# Patient Record
Sex: FEMALE | Race: WHITE | NOT HISPANIC OR LATINO | Employment: UNEMPLOYED | URBAN - METROPOLITAN AREA
[De-identification: names, ages, dates, MRNs, and addresses within clinical notes are randomized per-mention and may not be internally consistent; named-entity substitution may affect disease eponyms.]

---

## 2018-03-26 ENCOUNTER — OFFICE VISIT (OUTPATIENT)
Dept: FAMILY MEDICINE CLINIC | Facility: CLINIC | Age: 48
End: 2018-03-26
Payer: COMMERCIAL

## 2018-03-26 VITALS
DIASTOLIC BLOOD PRESSURE: 72 MMHG | BODY MASS INDEX: 22.96 KG/M2 | WEIGHT: 138 LBS | RESPIRATION RATE: 16 BRPM | HEART RATE: 76 BPM | TEMPERATURE: 100.6 F | SYSTOLIC BLOOD PRESSURE: 102 MMHG

## 2018-03-26 DIAGNOSIS — Z13.1 SCREENING FOR DIABETES MELLITUS (DM): ICD-10-CM

## 2018-03-26 DIAGNOSIS — F32.A ANXIETY AND DEPRESSION: ICD-10-CM

## 2018-03-26 DIAGNOSIS — F41.9 ANXIETY AND DEPRESSION: ICD-10-CM

## 2018-03-26 DIAGNOSIS — Z13.83 SCREENING FOR CARDIOVASCULAR, RESPIRATORY, AND GENITOURINARY DISEASES: ICD-10-CM

## 2018-03-26 DIAGNOSIS — J20.9 ACUTE BRONCHITIS, UNSPECIFIED ORGANISM: Primary | ICD-10-CM

## 2018-03-26 DIAGNOSIS — Z13.6 SCREENING FOR CARDIOVASCULAR, RESPIRATORY, AND GENITOURINARY DISEASES: ICD-10-CM

## 2018-03-26 DIAGNOSIS — Z13.89 SCREENING FOR CARDIOVASCULAR, RESPIRATORY, AND GENITOURINARY DISEASES: ICD-10-CM

## 2018-03-26 PROCEDURE — 99203 OFFICE O/P NEW LOW 30 MIN: CPT | Performed by: FAMILY MEDICINE

## 2018-03-26 RX ORDER — DULOXETIN HYDROCHLORIDE 20 MG/1
20 CAPSULE, DELAYED RELEASE ORAL DAILY
Qty: 90 CAPSULE | Refills: 1 | Status: SHIPPED | OUTPATIENT
Start: 2018-03-26 | End: 2018-05-07 | Stop reason: SDUPTHER

## 2018-03-26 RX ORDER — AZITHROMYCIN 250 MG/1
TABLET, FILM COATED ORAL
Qty: 6 TABLET | Refills: 0 | Status: SHIPPED | OUTPATIENT
Start: 2018-03-26 | End: 2018-03-31

## 2018-03-26 RX ORDER — CLONAZEPAM 2 MG/1
TABLET ORAL
COMMUNITY
End: 2018-03-26

## 2018-03-26 NOTE — PROGRESS NOTES
Assessment/Plan:    No problem-specific Assessment & Plan notes found for this encounter  Bronchitis new  Cont mucinex   Mood stable, cont duloxetine which I refilled w/o BZD  Screening labs ordered for upcoming cpe  F/u gyn, get yearly mammo  Colonoscopy this year suggested     Diagnoses and all orders for this visit:    Acute bronchitis, unspecified organism  -     azithromycin (ZITHROMAX) 250 mg tablet; Take 500mg on day 1, 250mg on days 2-5    Screening for diabetes mellitus (DM)  -     Comprehensive metabolic panel; Future    Screening for cardiovascular, respiratory, and genitourinary diseases  -     Lipid Panel with Direct LDL reflex; Future  -     TSH, 3rd generation; Future    Anxiety and depression  -     DULoxetine (CYMBALTA) 20 mg capsule; Take 1 capsule (20 mg total) by mouth daily    BMI 22 0-22 9, adult    Other orders  -     Discontinue: clonazePAM (KLONOPIN) 2 mg tablet; Take by mouth              Return for Annual physical     Subjective:      Patient ID: Bubba Barnett is a 50 y o  female  Chief Complaint   Patient presents with    Cough     Pt c/o chills, fever, cough and head congestion for the past two days   Nasal Congestion    Fever       HPI  Hollice Surekha sx  Last seen >4y ago  Last labs April/may 2017  Premenopausal  Sick contacts  5d  Chills  Fever  99 or more  Sore throat/irritated  Cough  Worse  More uncomfortable  Sharp px  Some mucinex taken  advil taken  No ear pain  Sinus congestion  No n/v/c/d  Babysitting    Sees psych from [facility], duloxetine, feels stable, not on klonopin over 1y and ok w/o  Aware that I do not recommend BZD use now or in future, agreeable  The following portions of the patient's history were reviewed and updated as appropriate: allergies, current medications, past family history, past medical history, past social history, past surgical history and problem list     Review of Systems   Constitutional: Negative for appetite change and fatigue     HENT: Positive for sinus pain and sinus pressure  Negative for nosebleeds and trouble swallowing  Eyes: Negative for pain, discharge and itching  Respiratory: Negative for shortness of breath and wheezing  Cardiovascular: Negative for chest pain  Gastrointestinal: Negative for abdominal pain, nausea and vomiting  Genitourinary: Negative for difficulty urinating and dysuria  Musculoskeletal: Negative for arthralgias  Skin: Negative for pallor and rash  Neurological: Negative for dizziness and syncope  Hematological: Negative for adenopathy  Psychiatric/Behavioral: Negative for dysphoric mood  The patient is not nervous/anxious  Current Outpatient Prescriptions   Medication Sig Dispense Refill    DULoxetine (CYMBALTA) 20 mg capsule Take 1 capsule (20 mg total) by mouth daily 90 capsule 1    azithromycin (ZITHROMAX) 250 mg tablet Take 500mg on day 1, 250mg on days 2-5 6 tablet 0     No current facility-administered medications for this visit  Objective:    /72   Pulse 76   Temp (!) 100 6 °F (38 1 °C)   Resp 16   Wt 62 6 kg (138 lb)   LMP 03/25/2018 (Exact Date)   BMI 22 96 kg/m²        Physical Exam   Constitutional: She appears well-developed  HENT:   Head: Normocephalic  Mouth/Throat: No oropharyngeal exudate  Eyes: Conjunctivae are normal  Right eye exhibits no discharge  Left eye exhibits no discharge  Neck: Neck supple  No thyromegaly present  Cardiovascular: Normal rate, regular rhythm and intact distal pulses  No murmur heard  Pulmonary/Chest: Effort normal and breath sounds normal  No respiratory distress  She exhibits no tenderness  Coarse midline cough   Abdominal: Soft  She exhibits no distension  There is no rebound  Musculoskeletal: She exhibits no edema or deformity  Lymphadenopathy:     She has no cervical adenopathy  Neurological: She is alert  Skin: Skin is warm and dry     Psychiatric: Her behavior is normal  Thought content normal  Nursing note and vitals reviewed               Thad Santana, DO

## 2018-03-27 ENCOUNTER — OFFICE VISIT (OUTPATIENT)
Dept: FAMILY MEDICINE CLINIC | Facility: CLINIC | Age: 48
End: 2018-03-27
Payer: COMMERCIAL

## 2018-03-27 VITALS
HEART RATE: 20 BPM | HEIGHT: 65 IN | TEMPERATURE: 101.2 F | DIASTOLIC BLOOD PRESSURE: 70 MMHG | RESPIRATION RATE: 16 BRPM | SYSTOLIC BLOOD PRESSURE: 126 MMHG

## 2018-03-27 DIAGNOSIS — F41.9 ANXIETY AND DEPRESSION: ICD-10-CM

## 2018-03-27 DIAGNOSIS — J20.8 ACUTE BRONCHITIS DUE TO OTHER SPECIFIED ORGANISMS: ICD-10-CM

## 2018-03-27 DIAGNOSIS — J11.1 INFLUENZA-LIKE ILLNESS: Primary | ICD-10-CM

## 2018-03-27 DIAGNOSIS — F32.A ANXIETY AND DEPRESSION: ICD-10-CM

## 2018-03-27 LAB — S PYO AG THROAT QL: NEGATIVE

## 2018-03-27 PROCEDURE — 99214 OFFICE O/P EST MOD 30 MIN: CPT | Performed by: FAMILY MEDICINE

## 2018-03-27 PROCEDURE — 87880 STREP A ASSAY W/OPTIC: CPT | Performed by: FAMILY MEDICINE

## 2018-03-27 RX ORDER — OSELTAMIVIR PHOSPHATE 75 MG/1
75 CAPSULE ORAL 2 TIMES DAILY
Qty: 10 CAPSULE | Refills: 0 | Status: SHIPPED | OUTPATIENT
Start: 2018-03-27 | End: 2018-04-02

## 2018-03-27 RX ORDER — PROMETHAZINE HYDROCHLORIDE AND CODEINE PHOSPHATE 6.25; 1 MG/5ML; MG/5ML
5 SYRUP ORAL EVERY 4 HOURS PRN
Qty: 120 ML | Refills: 0 | Status: SHIPPED | OUTPATIENT
Start: 2018-03-27 | End: 2018-05-07

## 2018-03-27 NOTE — PROGRESS NOTES
Assessment/Plan:    No problem-specific Assessment & Plan notes found for this encounter  Pt seen upon arrival at her request w/o appt     Diagnoses and all orders for this visit:    Influenza-like illness  -     oseltamivir (TAMIFLU) 75 mg capsule; Take 1 capsule (75 mg total) by mouth 2 (two) times a day for 5 days  -     promethazine-codeine (PHENERGAN WITH CODEINE) 6 25-10 mg/5 mL syrup; Take 5 mL by mouth every 4 (four) hours as needed for cough  -     XR chest pa & lateral; Future    Acute bronchitis due to other specified organisms  -     oseltamivir (TAMIFLU) 75 mg capsule; Take 1 capsule (75 mg total) by mouth 2 (two) times a day for 5 days  -     promethazine-codeine (PHENERGAN WITH CODEINE) 6 25-10 mg/5 mL syrup; Take 5 mL by mouth every 4 (four) hours as needed for cough  -     XR chest pa & lateral; Future  -     POCT rapid strepA    Anxiety and depression        Cont otc  Chloraseptic suggested  F/u if no better        No Follow-up on file  Subjective:      Patient ID: Shabnam Burch is a 50 y o  female  Chief Complaint   Patient presents with   Eston Chum Like Symptoms     not feeling better from visit yesterday        HPI    Worse today, high fever 102 this am, headache, chills, body aches, severe sore throat, cough is painful, no wheeze, tylenol and advil and mucinex  Took zpack yesterday  No other sick exposures  No flu shot this season     Anxiety/depression stable    The following portions of the patient's history were reviewed and updated as appropriate: allergies, current medications, past family history, past medical history, past social history, past surgical history and problem list     Review of Systems   Constitutional: Positive for chills and fever  Neurological: Negative for dizziness           Current Outpatient Prescriptions   Medication Sig Dispense Refill    azithromycin (ZITHROMAX) 250 mg tablet Take 500mg on day 1, 250mg on days 2-5 6 tablet 0    DULoxetine (CYMBALTA) 20 mg capsule Take 1 capsule (20 mg total) by mouth daily 90 capsule 1    oseltamivir (TAMIFLU) 75 mg capsule Take 1 capsule (75 mg total) by mouth 2 (two) times a day for 5 days 10 capsule 0    promethazine-codeine (PHENERGAN WITH CODEINE) 6 25-10 mg/5 mL syrup Take 5 mL by mouth every 4 (four) hours as needed for cough 120 mL 0     No current facility-administered medications for this visit  Objective:    /70   Pulse (!) 20   Temp (!) 101 2 °F (38 4 °C)   Resp 16   Ht 5' 5" (1 651 m)   LMP 03/25/2018 (Exact Date)        Physical Exam   Constitutional: She appears well-developed  HENT:   Head: Normocephalic  Mouth/Throat: Posterior oropharyngeal erythema present  No oropharyngeal exudate, posterior oropharyngeal edema or tonsillar abscesses  Eyes: Conjunctivae are normal    Neck: Neck supple  No tracheal deviation present  No thyromegaly present  Cardiovascular: Normal rate and intact distal pulses  Pulmonary/Chest: Effort normal  No stridor  No respiratory distress  Abdominal: Soft  Musculoskeletal: She exhibits no edema or deformity  Lymphadenopathy:     She has no cervical adenopathy  Neurological: She is alert  Skin: Skin is warm and dry  Psychiatric: Her behavior is normal  Thought content normal    Nursing note and vitals reviewed               Eamon Armstrong DO

## 2018-03-28 NOTE — PATIENT INSTRUCTIONS
Stay on the zithromax and cold medication and fever medication  Start the flu medication called Tamiflu today  Do a chest xray by Friday if you do not feel better  Use the codeine cough syrup for pain and cough at night time but carefully

## 2018-04-02 ENCOUNTER — OFFICE VISIT (OUTPATIENT)
Dept: FAMILY MEDICINE CLINIC | Facility: CLINIC | Age: 48
End: 2018-04-02
Payer: COMMERCIAL

## 2018-04-02 VITALS
RESPIRATION RATE: 20 BRPM | WEIGHT: 136 LBS | SYSTOLIC BLOOD PRESSURE: 122 MMHG | HEART RATE: 78 BPM | BODY MASS INDEX: 22.66 KG/M2 | DIASTOLIC BLOOD PRESSURE: 70 MMHG | TEMPERATURE: 98.8 F | HEIGHT: 65 IN

## 2018-04-02 DIAGNOSIS — J20.8 ACUTE BRONCHITIS DUE TO OTHER SPECIFIED ORGANISMS: Primary | ICD-10-CM

## 2018-04-02 DIAGNOSIS — J11.1 INFLUENZA-LIKE ILLNESS: ICD-10-CM

## 2018-04-02 PROCEDURE — 99213 OFFICE O/P EST LOW 20 MIN: CPT | Performed by: FAMILY MEDICINE

## 2018-04-02 RX ORDER — METHYLPREDNISOLONE 4 MG/1
TABLET ORAL
Qty: 21 TABLET | Refills: 0 | Status: SHIPPED | OUTPATIENT
Start: 2018-04-02 | End: 2018-04-08

## 2018-04-02 NOTE — PROGRESS NOTES
Assessment/Plan:    No problem-specific Assessment & Plan notes found for this encounter  Weakness, f/u if no better, labs possible  LEONCIO improving       Diagnoses and all orders for this visit:    Acute bronchitis due to other specified organisms  -     Methylprednisolone 4 MG TBPK; Use as directed on package    Influenza-like illness        And mucinex  Call 48hrs if no better, another abx? 10d  Lungs clear  No homans        No Follow-up on file  Subjective:      Patient ID: Abraham Chowdhury is a 50 y o  female  Chief Complaint   Patient presents with    Follow-up     Bronchitis  rmklpn       HPI   Fever coming down, 99 yest, sweats all day and night, cough is bad, frequent, greenish thick mucus, not wheeze, voice improving, feel very weak, did not do cxr, not as painful throat, sleeping ok, some cough wakes her,  not sick yet, eating /drinking ok, some dec appetite, taking mucinex   No hemoptysis  Did not need the prometh/codeine  Finished the zpack and tamiflu  Severe sore throat is improved    The following portions of the patient's history were reviewed and updated as appropriate: allergies, current medications, past family history, past medical history, past social history, past surgical history and problem list     Review of Systems   Respiratory: Negative for shortness of breath and wheezing  Neurological: Negative for dizziness  Current Outpatient Prescriptions   Medication Sig Dispense Refill    DULoxetine (CYMBALTA) 20 mg capsule Take 1 capsule (20 mg total) by mouth daily 90 capsule 1    promethazine-codeine (PHENERGAN WITH CODEINE) 6 25-10 mg/5 mL syrup Take 5 mL by mouth every 4 (four) hours as needed for cough 120 mL 0    Methylprednisolone 4 MG TBPK Use as directed on package 21 tablet 0     No current facility-administered medications for this visit          Objective:    /70   Pulse 78   Temp 98 8 °F (37 1 °C)   Resp 20   Ht 5' 5" (1 651 m)   Wt 61 7 kg (136 lb)   LMP 03/25/2018 (Exact Date)   BMI 22 63 kg/m²        Physical Exam   Constitutional: She appears well-developed  HENT:   Head: Normocephalic  Eyes: Conjunctivae are normal    Neck: Neck supple  Cardiovascular: Normal rate and intact distal pulses  Pulmonary/Chest: Effort normal  No respiratory distress  She has no wheezes  She has no rales  Coarse cough   Abdominal: Soft  Musculoskeletal: She exhibits no edema or deformity  Neurological: She is alert  Skin: Skin is warm and dry  Psychiatric: Her behavior is normal  Thought content normal    Nursing note and vitals reviewed               Avinash Nixon DO

## 2018-04-02 NOTE — PATIENT INSTRUCTIONS
You can call office in 48hrs if no better as I would likely call in a 10 day course an antibiotic and ask you to get the Chest Xray done

## 2018-04-02 NOTE — PROGRESS NOTES
Assessment/Plan:    No problem-specific Assessment & Plan notes found for this encounter  Diagnoses and all orders for this visit:    Acute bronchitis due to other specified organisms  -     Methylprednisolone 4 MG TBPK; Use as directed on package              No Follow-up on file  Subjective:      Patient ID: Gabriela Montana is a 50 y o  female  Chief Complaint   Patient presents with    Follow-up     Bronchitis  rmklpn       HPI    The following portions of the patient's history were reviewed and updated as appropriate: allergies, current medications, past family history, past medical history, past social history, past surgical history and problem list     Review of Systems      Current Outpatient Prescriptions   Medication Sig Dispense Refill    DULoxetine (CYMBALTA) 20 mg capsule Take 1 capsule (20 mg total) by mouth daily 90 capsule 1    promethazine-codeine (PHENERGAN WITH CODEINE) 6 25-10 mg/5 mL syrup Take 5 mL by mouth every 4 (four) hours as needed for cough 120 mL 0    Methylprednisolone 4 MG TBPK Use as directed on package 21 tablet 0     No current facility-administered medications for this visit          Objective:    /70   Pulse 78   Temp 98 8 °F (37 1 °C)   Resp 20   Ht 5' 5" (1 651 m)   Wt 61 7 kg (136 lb)   LMP 03/25/2018 (Exact Date)   BMI 22 63 kg/m²        Physical Exam           Hannah Carbajal DO

## 2018-04-05 ENCOUNTER — TRANSCRIBE ORDERS (OUTPATIENT)
Dept: ADMINISTRATIVE | Facility: HOSPITAL | Age: 48
End: 2018-04-05

## 2018-04-05 ENCOUNTER — HOSPITAL ENCOUNTER (OUTPATIENT)
Dept: RADIOLOGY | Facility: HOSPITAL | Age: 48
Discharge: HOME | End: 2018-04-05
Attending: FAMILY MEDICINE
Payer: COMMERCIAL

## 2018-04-05 DIAGNOSIS — J20.8 ACUTE BRONCHITIS DUE TO OTHER SPECIFIED ORGANISMS: ICD-10-CM

## 2018-04-05 DIAGNOSIS — J11.1 INFLUENZA-LIKE ILLNESS: ICD-10-CM

## 2018-04-05 PROCEDURE — 71046 X-RAY EXAM CHEST 2 VIEWS: CPT

## 2018-04-06 ENCOUNTER — TELEPHONE (OUTPATIENT)
Dept: FAMILY MEDICINE CLINIC | Facility: CLINIC | Age: 48
End: 2018-04-06

## 2018-04-06 DIAGNOSIS — J20.8 ACUTE BRONCHITIS DUE TO OTHER SPECIFIED ORGANISMS: Primary | ICD-10-CM

## 2018-04-06 RX ORDER — CEFUROXIME AXETIL 500 MG/1
500 TABLET ORAL EVERY 12 HOURS SCHEDULED
Qty: 20 TABLET | Refills: 0 | Status: SHIPPED | OUTPATIENT
Start: 2018-04-06 | End: 2018-04-16

## 2018-04-06 NOTE — TELEPHONE ENCOUNTER
Spoke to  -wife does not feel good yet-possibly another antibiotic--also interested in x-ray results please call [phone]--thanks

## 2018-04-06 NOTE — TELEPHONE ENCOUNTER
sw pt  Yellow mucus  Some recurring sore throat  Temp 99  Await cxr  ceftin 500mg bid x 10d in meantime

## 2018-04-06 NOTE — TELEPHONE ENCOUNTER
DR Wilfrido Montana    Patient would like to speak to you about her xray  Also she is not feeling better  Please call back

## 2018-04-07 NOTE — TELEPHONE ENCOUNTER
Patients  called again this morning, he said it has been 4 days, we need to call the hospital and get these results  I explained that his wife went Thursday (at the end of the day) and a radiologist needs to read report and if they saw something that we needed to know immediately they would make it urgent  I saw report was final as of 929am this morning   I read him the impression on the bottom of report, but told him that the doctor needs to verify and sign off report first

## 2018-04-09 NOTE — TELEPHONE ENCOUNTER
Spoke with pts , he is aware of CXR results  He did state pt went to an Urgent care the other day because she was unsure of what she should do next, pt is still a little SOB and getting tired quickly  Pt is still on abx that was Rx the other day  Pt aware to finish abx and call our office if no better after this round   Ramila Mayorga

## 2018-05-07 ENCOUNTER — OFFICE VISIT (OUTPATIENT)
Dept: FAMILY MEDICINE CLINIC | Facility: CLINIC | Age: 48
End: 2018-05-07
Payer: COMMERCIAL

## 2018-05-07 VITALS
BODY MASS INDEX: 23.66 KG/M2 | HEART RATE: 74 BPM | HEIGHT: 65 IN | WEIGHT: 142 LBS | RESPIRATION RATE: 16 BRPM | SYSTOLIC BLOOD PRESSURE: 112 MMHG | DIASTOLIC BLOOD PRESSURE: 62 MMHG | TEMPERATURE: 98.8 F

## 2018-05-07 DIAGNOSIS — F41.9 ANXIETY AND DEPRESSION: ICD-10-CM

## 2018-05-07 DIAGNOSIS — F32.A ANXIETY AND DEPRESSION: ICD-10-CM

## 2018-05-07 DIAGNOSIS — Z12.39 BREAST CANCER SCREENING: ICD-10-CM

## 2018-05-07 DIAGNOSIS — Z12.11 COLON CANCER SCREENING: ICD-10-CM

## 2018-05-07 DIAGNOSIS — Z00.00 HEALTHCARE MAINTENANCE: Primary | ICD-10-CM

## 2018-05-07 PROBLEM — J20.9 ACUTE BRONCHITIS: Status: RESOLVED | Noted: 2018-03-26 | Resolved: 2018-05-07

## 2018-05-07 PROBLEM — J11.1 INFLUENZA-LIKE ILLNESS: Status: RESOLVED | Noted: 2018-03-27 | Resolved: 2018-05-07

## 2018-05-07 PROCEDURE — 99396 PREV VISIT EST AGE 40-64: CPT | Performed by: FAMILY MEDICINE

## 2018-05-07 RX ORDER — VALACYCLOVIR HYDROCHLORIDE 500 MG/1
TABLET, FILM COATED ORAL
COMMUNITY
Start: 2018-04-25 | End: 2018-05-07

## 2018-05-07 RX ORDER — DULOXETIN HYDROCHLORIDE 20 MG/1
20 CAPSULE, DELAYED RELEASE ORAL DAILY
Qty: 90 CAPSULE | Refills: 3 | Status: SHIPPED | OUTPATIENT
Start: 2018-05-07 | End: 2019-09-07 | Stop reason: SDUPTHER

## 2018-05-07 RX ORDER — PREDNISONE 20 MG/1
TABLET ORAL
COMMUNITY
Start: 2018-05-03 | End: 2020-06-07

## 2018-05-07 RX ORDER — CLOBETASOL PROPIONATE 0.5 MG/G
CREAM TOPICAL
COMMUNITY
Start: 2018-04-25 | End: 2018-05-07

## 2018-05-07 NOTE — PROGRESS NOTES
Assessment/Plan:    No problem-specific Assessment & Plan notes found for this encounter  offer local gyn  mammo qyear  Colonoscopy at age 46  adacel in future  Labs pending, reordered, wait until done with prednisone at least 1w  f/u with derm s/p bx     Diagnoses and all orders for this visit:    Healthcare maintenance  -     Ambulatory referral to Obstetrics / Gynecology; Future    Anxiety and depression  -     DULoxetine (CYMBALTA) 20 mg capsule; Take 1 capsule (20 mg total) by mouth daily    Breast cancer screening  -     Mammo screening bilateral w cad; Future    Colon cancer screening  -     Ambulatory referral to Gastroenterology; Future    Other orders  -     predniSONE 20 mg tablet;   -     PROAIR  (90 Base) MCG/ACT inhaler;   -     Discontinue: clobetasol (TEMOVATE) 0 05 % cream;   -     Discontinue: halobetasol (ULTRAVATE) 0 05 % cream;   -     triamcinolone (KENALOG) 0 1 % ointment;   -     Discontinue: valACYclovir (VALTREX) 500 mg tablet; No Follow-up on file  Subjective:      Patient ID: Althea Mario is a 50 y o  female  Chief Complaint   Patient presents with    Physical Exam     af/rma        HPI   saw derm Dr Sarmad Barnes then Dr Camelia Tong in [facility]  Given po prednisone for 5d helped some but recurred, seen last week, and given 20mg/d for 7d and helped and bx taken derm  Triam oint 0 1% oint bid whole body  Dx pending for rash  Mood stable  Eating healthy  Exercises only a little  No grandchildren yet  Cough finally better  Defers adacel today    The following portions of the patient's history were reviewed and updated as appropriate: allergies, current medications, past family history, past medical history, past social history, past surgical history and problem list     Review of Systems   Respiratory: Negative for shortness of breath  Cardiovascular: Negative for chest pain           Current Outpatient Prescriptions   Medication Sig Dispense Refill    DULoxetine (CYMBALTA) 20 mg capsule Take 1 capsule (20 mg total) by mouth daily 90 capsule 3    predniSONE 20 mg tablet       PROAIR  (90 Base) MCG/ACT inhaler       triamcinolone (KENALOG) 0 1 % ointment        No current facility-administered medications for this visit  Objective:    /62   Pulse 74   Temp 98 8 °F (37 1 °C)   Resp 16   Ht 5' 5" (1 651 m)   Wt 64 4 kg (142 lb)   BMI 23 63 kg/m²        Physical Exam   Constitutional: She appears well-developed  HENT:   Head: Normocephalic  Eyes: Conjunctivae are normal    Neck: Neck supple  Cardiovascular: Normal rate and intact distal pulses  Pulmonary/Chest: Effort normal  No respiratory distress  Abdominal: Soft  Musculoskeletal: She exhibits no edema or deformity  Neurological: She is alert  She has normal reflexes  She displays normal reflexes  She exhibits normal muscle tone  Skin: Skin is warm and dry  No rash noted  No erythema  No pallor  Psychiatric: Her behavior is normal  Thought content normal    Nursing note and vitals reviewed               Yuli Gray, DO

## 2018-05-07 NOTE — PATIENT INSTRUCTIONS
We can plan to give you a tetanus-pertussis vaccine someday in the future, but not when on prednisone  Colonoscopy suggested at age 46  SHINGRIX is the new, more effective vaccine for Shingles  It is more than 90% effective  You should check with your local pharmacy and insurance company for availability and coverage  It is a 2 shot series, with the second shot given between 2-6 months after the first, and is approved for ages 48 and up

## 2018-06-21 PROBLEM — R73.01 IFG (IMPAIRED FASTING GLUCOSE): Status: ACTIVE | Noted: 2018-06-21

## 2018-06-21 LAB
ALBUMIN SERPL-MCNC: 3.8 G/DL (ref 3.5–5.5)
ALBUMIN/GLOB SERPL: 1.5 {RATIO} (ref 1.2–2.2)
ALP SERPL-CCNC: 52 IU/L (ref 39–117)
ALT SERPL-CCNC: 13 IU/L (ref 0–32)
AMBIG ABBREV DEFAULT: NORMAL
AST SERPL-CCNC: 17 IU/L (ref 0–40)
BILIRUB SERPL-MCNC: 0.4 MG/DL (ref 0–1.2)
BUN SERPL-MCNC: 22 MG/DL (ref 6–24)
BUN/CREAT SERPL: 31 (ref 9–23)
CALCIUM SERPL-MCNC: 8.7 MG/DL (ref 8.7–10.2)
CHLORIDE SERPL-SCNC: 105 MMOL/L (ref 96–106)
CHOLEST SERPL-MCNC: 222 MG/DL (ref 100–199)
CO2 SERPL-SCNC: 25 MMOL/L (ref 20–29)
CREAT SERPL-MCNC: 0.7 MG/DL (ref 0.57–1)
GLOBULIN SER-MCNC: 2.5 G/DL (ref 1.5–4.5)
GLUCOSE SERPL-MCNC: 103 MG/DL (ref 65–99)
HDLC SERPL-MCNC: 79 MG/DL
LDLC SERPL CALC-MCNC: 136 MG/DL (ref 0–99)
MICRODELETION SYND BLD/T FISH: NORMAL
POTASSIUM SERPL-SCNC: 4 MMOL/L (ref 3.5–5.2)
PROT SERPL-MCNC: 6.3 G/DL (ref 6–8.5)
SL AMB EGFR AFRICAN AMERICAN: 118 ML/MIN/1.73
SL AMB EGFR NON AFRICAN AMERICAN: 102 ML/MIN/1.73
SODIUM SERPL-SCNC: 143 MMOL/L (ref 134–144)
TRIGL SERPL-MCNC: 36 MG/DL (ref 0–149)
TSH SERPL DL<=0.005 MIU/L-ACNC: 0.44 UIU/ML (ref 0.45–4.5)

## 2019-09-07 ENCOUNTER — OFFICE VISIT (OUTPATIENT)
Dept: FAMILY MEDICINE CLINIC | Facility: CLINIC | Age: 49
End: 2019-09-07
Payer: COMMERCIAL

## 2019-09-07 VITALS
WEIGHT: 150.8 LBS | BODY MASS INDEX: 25.12 KG/M2 | DIASTOLIC BLOOD PRESSURE: 74 MMHG | SYSTOLIC BLOOD PRESSURE: 126 MMHG | RESPIRATION RATE: 18 BRPM | TEMPERATURE: 97.7 F | HEART RATE: 64 BPM | HEIGHT: 65 IN | OXYGEN SATURATION: 98 %

## 2019-09-07 DIAGNOSIS — F32.A ANXIETY AND DEPRESSION: ICD-10-CM

## 2019-09-07 DIAGNOSIS — L03.011 PARONYCHIA OF FINGER, RIGHT: Primary | ICD-10-CM

## 2019-09-07 DIAGNOSIS — F41.9 ANXIETY AND DEPRESSION: ICD-10-CM

## 2019-09-07 PROCEDURE — 99213 OFFICE O/P EST LOW 20 MIN: CPT | Performed by: FAMILY MEDICINE

## 2019-09-07 PROCEDURE — 3008F BODY MASS INDEX DOCD: CPT | Performed by: FAMILY MEDICINE

## 2019-09-07 RX ORDER — SULFAMETHOXAZOLE AND TRIMETHOPRIM 800; 160 MG/1; MG/1
1 TABLET ORAL EVERY 12 HOURS SCHEDULED
Qty: 14 TABLET | Refills: 0 | Status: SHIPPED | OUTPATIENT
Start: 2019-09-07 | End: 2019-09-14

## 2019-09-07 RX ORDER — FERROUS SULFATE 325(65) MG
325 TABLET ORAL
COMMUNITY
End: 2020-06-09

## 2019-09-07 RX ORDER — DULOXETIN HYDROCHLORIDE 20 MG/1
20 CAPSULE, DELAYED RELEASE ORAL DAILY
Qty: 90 CAPSULE | Refills: 1 | Status: SHIPPED | OUTPATIENT
Start: 2019-09-07 | End: 2020-03-18 | Stop reason: SDUPTHER

## 2019-09-07 RX ORDER — LANOLIN ALCOHOL/MO/W.PET/CERES
CREAM (GRAM) TOPICAL
COMMUNITY

## 2019-09-07 NOTE — PROGRESS NOTES
Assessment/Plan:    Problem List Items Addressed This Visit     Anxiety and depression    Relevant Medications    DULoxetine (CYMBALTA) 20 mg capsule      Other Visit Diagnoses     Paronychia of finger, right    -  Primary    New, advised her to elevated her hand and use warm soaks     Relevant Medications    sulfamethoxazole-trimethoprim (BACTRIM DS) 800-160 mg per tablet          BMI Counseling: Body mass index is 25 49 kg/m²  Discussed the patient's BMI with her  The BMI is above average  BMI counseling and education was provided to the patient  Exercise recommendations include exercising 3-5 times per week  There are no Patient Instructions on file for this visit  Return if symptoms worsen or fail to improve  Subjective:      Patient ID: Castillo Yoder is a 46 y o  female  Chief Complaint   Patient presents with    Hand Pain     [facility]       She has a swollen right ring finger  It is red and tender  She has been soaking it and used neosporin  The following portions of the patient's history were reviewed and updated as appropriate:  past social history    Review of Systems      Current Outpatient Medications   Medication Sig Dispense Refill    Calcium 500-100 MG-UNIT CHEW Calcium 500      Cholecalciferol (VITAMIN D3) 1000 UNIT/SPRAY LIQD Vitamin D3      DULoxetine (CYMBALTA) 20 mg capsule Take 1 capsule (20 mg total) by mouth daily 90 capsule 1    ferrous sulfate 325 (65 Fe) mg tablet Take 325 mg by mouth daily with breakfast      predniSONE 20 mg tablet       PROAIR  (90 Base) MCG/ACT inhaler       sulfamethoxazole-trimethoprim (BACTRIM DS) 800-160 mg per tablet Take 1 tablet by mouth every 12 (twelve) hours for 7 days 14 tablet 0    triamcinolone (KENALOG) 0 1 % ointment        No current facility-administered medications for this visit          Objective:    /74   Pulse 64   Temp 97 7 °F (36 5 °C)   Resp 18   Ht 5' 4 5" (1 638 m)   Wt 68 4 kg (150 lb 12 8 oz)   SpO2 98%   BMI 25 49 kg/m²        Physical Exam   Constitutional: She appears well-developed and well-nourished  Cardiovascular: Normal rate, regular rhythm and normal heart sounds  Pulmonary/Chest: Effort normal and breath sounds normal  No respiratory distress  She has no wheezes  Skin: There is erythema (right ring finger, see picture)  Nursing note and vitals reviewed                 Alejandro Hood DO

## 2020-03-18 DIAGNOSIS — F41.9 ANXIETY AND DEPRESSION: ICD-10-CM

## 2020-03-18 DIAGNOSIS — F32.A ANXIETY AND DEPRESSION: ICD-10-CM

## 2020-03-18 RX ORDER — DULOXETIN HYDROCHLORIDE 20 MG/1
20 CAPSULE, DELAYED RELEASE ORAL DAILY
Qty: 90 CAPSULE | Refills: 0 | Status: SHIPPED | OUTPATIENT
Start: 2020-03-18 | End: 2020-06-09 | Stop reason: SDUPTHER

## 2020-03-18 NOTE — TELEPHONE ENCOUNTER
I refilled the patient's medication, but they are due for an appointment    Please ask them to schedule  (lookslike she normally sees Dr Blu Palmer)  Thank you,  Michael Shetty, DO

## 2020-06-07 PROBLEM — J30.1 ALLERGIC RHINITIS DUE TO POLLEN: Status: ACTIVE | Noted: 2020-06-07

## 2020-06-07 RX ORDER — CLONAZEPAM 0.5 MG/1
TABLET ORAL
COMMUNITY
Start: 2015-04-24 | End: 2020-06-09 | Stop reason: SDUPTHER

## 2020-06-09 ENCOUNTER — OFFICE VISIT (OUTPATIENT)
Dept: FAMILY MEDICINE CLINIC | Facility: CLINIC | Age: 50
End: 2020-06-09
Payer: COMMERCIAL

## 2020-06-09 ENCOUNTER — TELEPHONE (OUTPATIENT)
Dept: FAMILY MEDICINE CLINIC | Facility: CLINIC | Age: 50
End: 2020-06-09

## 2020-06-09 VITALS
HEIGHT: 64 IN | RESPIRATION RATE: 16 BRPM | DIASTOLIC BLOOD PRESSURE: 60 MMHG | SYSTOLIC BLOOD PRESSURE: 120 MMHG | TEMPERATURE: 98.6 F | BODY MASS INDEX: 25.78 KG/M2 | OXYGEN SATURATION: 99 % | WEIGHT: 151 LBS | HEART RATE: 77 BPM

## 2020-06-09 DIAGNOSIS — F41.9 ANXIETY AND DEPRESSION: ICD-10-CM

## 2020-06-09 DIAGNOSIS — F33.9 RECURRENT DEPRESSION (HCC): ICD-10-CM

## 2020-06-09 DIAGNOSIS — F32.A ANXIETY AND DEPRESSION: ICD-10-CM

## 2020-06-09 DIAGNOSIS — Z00.00 HEALTHCARE MAINTENANCE: Primary | ICD-10-CM

## 2020-06-09 DIAGNOSIS — F41.1 GAD (GENERALIZED ANXIETY DISORDER): ICD-10-CM

## 2020-06-09 DIAGNOSIS — B00.2 HERPES GINGIVOSTOMATITIS: ICD-10-CM

## 2020-06-09 PROBLEM — R73.01 IFG (IMPAIRED FASTING GLUCOSE): Status: RESOLVED | Noted: 2018-06-21 | Resolved: 2020-06-09

## 2020-06-09 PROCEDURE — 99396 PREV VISIT EST AGE 40-64: CPT | Performed by: FAMILY MEDICINE

## 2020-06-09 PROCEDURE — 3008F BODY MASS INDEX DOCD: CPT | Performed by: FAMILY MEDICINE

## 2020-06-09 RX ORDER — VALACYCLOVIR HYDROCHLORIDE 1 G/1
TABLET, FILM COATED ORAL
Qty: 40 TABLET | Refills: 2 | Status: SHIPPED | OUTPATIENT
Start: 2020-06-09 | End: 2021-01-22

## 2020-06-09 RX ORDER — CLONAZEPAM 0.5 MG/1
0.5 TABLET ORAL EVERY 8 HOURS PRN
Qty: 30 TABLET | Refills: 1 | Status: SHIPPED | OUTPATIENT
Start: 2020-06-09 | End: 2021-01-22

## 2020-06-09 RX ORDER — DULOXETIN HYDROCHLORIDE 30 MG/1
30 CAPSULE, DELAYED RELEASE ORAL DAILY
Qty: 90 CAPSULE | Refills: 1 | Status: SHIPPED | OUTPATIENT
Start: 2020-06-09 | End: 2021-01-22

## 2020-06-10 ENCOUNTER — TELEPHONE (OUTPATIENT)
Dept: ADMINISTRATIVE | Facility: OTHER | Age: 50
End: 2020-06-10

## 2020-06-10 NOTE — LETTER
Procedure Request Form: Cervical Cancer Screening      Date Requested: 06/10/20  Patient: Rona Steel  Patient : 1970   Referring Provider: Miranda Martini, DO        Date of Procedure ______________________________       The above patient has informed us that they have completed their   most recent Cervical Cancer Screening at your facility  Please complete   this form and attach all corresponding procedure reports/results  Comments __________________________________________________________  ____________________________________________________________________  ____________________________________________________________________  ____________________________________________________________________    Facility Completing Procedure _________________________________________    Form Completed By (print name) _______________________________________      Signature __________________________________________________________      These reports are needed for  compliance    Please fax this completed form and a copy of the procedure report to our office located at [place] [address] as soon as possible to [phone] mello Umanzor: Phone [phone]    We thank you for your assistance in treating our mutual patient

## 2020-06-10 NOTE — TELEPHONE ENCOUNTER
----- Message from Renetta Montgomery DO sent at 6/9/2020  9:08 PM EDT -----  06/09/20 9:09 PM    Hello, our patient Haley Packer has had Mammogram and Pap Smear (HPV) aka Cervical Cancer Screening completed/performed  Please assist in updating the patient chart by making an External outreach to Gyn Dr Hugo Wahl [facility]  Tel [phone]  Fax [phone] facility located in [facility]  The date of service is recent      Thank you,  Renetta Montgomery DO  [place] [address]

## 2020-06-10 NOTE — TELEPHONE ENCOUNTER
Upon review of the In Basket request and the patient's chart, initial outreach has been made via fax, please see Contacts section for details  A second outreach attempt will be made within 5 business days      Thank you  Jeniffer Marcus

## 2021-01-22 ENCOUNTER — APPOINTMENT (EMERGENCY)
Dept: RADIOLOGY | Facility: HOSPITAL | Age: 51
End: 2021-01-22
Payer: COMMERCIAL

## 2021-01-22 ENCOUNTER — HOSPITAL ENCOUNTER (EMERGENCY)
Facility: HOSPITAL | Age: 51
Discharge: HOME | End: 2021-01-22
Attending: EMERGENCY MEDICINE | Admitting: EMERGENCY MEDICINE
Payer: COMMERCIAL

## 2021-01-22 ENCOUNTER — OFFICE VISIT (OUTPATIENT)
Dept: URGENT CARE | Facility: CLINIC | Age: 51
End: 2021-01-22
Payer: COMMERCIAL

## 2021-01-22 VITALS
HEART RATE: 61 BPM | BODY MASS INDEX: 26.69 KG/M2 | SYSTOLIC BLOOD PRESSURE: 120 MMHG | TEMPERATURE: 96 F | OXYGEN SATURATION: 99 % | WEIGHT: 160.2 LBS | RESPIRATION RATE: 18 BRPM | DIASTOLIC BLOOD PRESSURE: 58 MMHG | HEIGHT: 65 IN

## 2021-01-22 VITALS
RESPIRATION RATE: 16 BRPM | WEIGHT: 160 LBS | HEIGHT: 65 IN | HEART RATE: 57 BPM | SYSTOLIC BLOOD PRESSURE: 144 MMHG | TEMPERATURE: 98.6 F | BODY MASS INDEX: 26.66 KG/M2 | DIASTOLIC BLOOD PRESSURE: 74 MMHG | OXYGEN SATURATION: 100 %

## 2021-01-22 DIAGNOSIS — R20.2 NUMBNESS AND TINGLING IN LEFT ARM: Primary | ICD-10-CM

## 2021-01-22 DIAGNOSIS — M54.10 RADICULOPATHY: ICD-10-CM

## 2021-01-22 DIAGNOSIS — R20.0 NUMBNESS AND TINGLING IN LEFT ARM: Primary | ICD-10-CM

## 2021-01-22 DIAGNOSIS — M79.602 LEFT ARM PAIN: Primary | ICD-10-CM

## 2021-01-22 DIAGNOSIS — R11.0 NAUSEA: ICD-10-CM

## 2021-01-22 DIAGNOSIS — R42 LIGHTHEADEDNESS: ICD-10-CM

## 2021-01-22 LAB
ALBUMIN SERPL BCP-MCNC: 3.3 G/DL (ref 3.5–5)
ALP SERPL-CCNC: 59 U/L (ref 46–116)
ALT SERPL W P-5'-P-CCNC: 22 U/L (ref 12–78)
ANION GAP SERPL CALCULATED.3IONS-SCNC: 6 MMOL/L (ref 4–13)
APTT PPP: 29 SECONDS (ref 23–37)
AST SERPL W P-5'-P-CCNC: 22 U/L (ref 5–45)
BASOPHILS # BLD AUTO: 0.08 THOUSANDS/ΜL (ref 0–0.1)
BASOPHILS NFR BLD AUTO: 2 % (ref 0–1)
BILIRUB SERPL-MCNC: 0.4 MG/DL (ref 0.2–1)
BUN SERPL-MCNC: 17 MG/DL (ref 5–25)
CALCIUM ALBUM COR SERPL-MCNC: 8.8 MG/DL (ref 8.3–10.1)
CALCIUM SERPL-MCNC: 8.2 MG/DL (ref 8.3–10.1)
CHLORIDE SERPL-SCNC: 106 MMOL/L (ref 100–108)
CO2 SERPL-SCNC: 27 MMOL/L (ref 21–32)
CREAT SERPL-MCNC: 0.73 MG/DL (ref 0.6–1.3)
EOSINOPHIL # BLD AUTO: 0.12 THOUSAND/ΜL (ref 0–0.61)
EOSINOPHIL NFR BLD AUTO: 3 % (ref 0–6)
ERYTHROCYTE [DISTWIDTH] IN BLOOD BY AUTOMATED COUNT: 12.5 % (ref 11.6–15.1)
GFR SERPL CREATININE-BSD FRML MDRD: 95 ML/MIN/1.73SQ M
GLUCOSE SERPL-MCNC: 108 MG/DL (ref 65–140)
HCT VFR BLD AUTO: 38.1 % (ref 34.8–46.1)
HGB BLD-MCNC: 12.5 G/DL (ref 11.5–15.4)
INR PPP: 0.96 (ref 0.84–1.19)
LYMPHOCYTES # BLD AUTO: 1.41 THOUSANDS/ΜL (ref 0.6–4.47)
LYMPHOCYTES NFR BLD AUTO: 33 % (ref 14–44)
MCH RBC QN AUTO: 32 PG (ref 26.8–34.3)
MCHC RBC AUTO-ENTMCNC: 32.8 G/DL (ref 31.4–37.4)
MCV RBC AUTO: 97 FL (ref 82–98)
MONOCYTES # BLD AUTO: 0.38 THOUSAND/ΜL (ref 0.17–1.22)
MONOCYTES NFR BLD AUTO: 9 % (ref 4–12)
NEUTROPHILS # BLD AUTO: 2.34 THOUSANDS/ΜL (ref 1.85–7.62)
NEUTS SEG NFR BLD AUTO: 53 % (ref 43–75)
PLATELET # BLD AUTO: 259 THOUSANDS/UL (ref 149–390)
PMV BLD AUTO: 9.5 FL (ref 8.9–12.7)
POTASSIUM SERPL-SCNC: 4.3 MMOL/L (ref 3.5–5.3)
PROT SERPL-MCNC: 7 G/DL (ref 6.4–8.2)
PROTHROMBIN TIME: 12.7 SECONDS (ref 11.6–14.5)
RBC # BLD AUTO: 3.91 MILLION/UL (ref 3.81–5.12)
SODIUM SERPL-SCNC: 139 MMOL/L (ref 136–145)
TROPONIN I SERPL-MCNC: <0.02 NG/ML
WBC # BLD AUTO: 4.33 THOUSAND/UL (ref 4.31–10.16)

## 2021-01-22 PROCEDURE — 99285 EMERGENCY DEPT VISIT HI MDM: CPT | Performed by: EMERGENCY MEDICINE

## 2021-01-22 PROCEDURE — 99213 OFFICE O/P EST LOW 20 MIN: CPT | Performed by: PHYSICIAN ASSISTANT

## 2021-01-22 PROCEDURE — 85730 THROMBOPLASTIN TIME PARTIAL: CPT | Performed by: EMERGENCY MEDICINE

## 2021-01-22 PROCEDURE — 84484 ASSAY OF TROPONIN QUANT: CPT | Performed by: EMERGENCY MEDICINE

## 2021-01-22 PROCEDURE — 70498 CT ANGIOGRAPHY NECK: CPT

## 2021-01-22 PROCEDURE — 85610 PROTHROMBIN TIME: CPT | Performed by: EMERGENCY MEDICINE

## 2021-01-22 PROCEDURE — 70496 CT ANGIOGRAPHY HEAD: CPT

## 2021-01-22 PROCEDURE — 99284 EMERGENCY DEPT VISIT MOD MDM: CPT

## 2021-01-22 PROCEDURE — 85025 COMPLETE CBC W/AUTO DIFF WBC: CPT | Performed by: EMERGENCY MEDICINE

## 2021-01-22 PROCEDURE — 36415 COLL VENOUS BLD VENIPUNCTURE: CPT | Performed by: EMERGENCY MEDICINE

## 2021-01-22 PROCEDURE — G1004 CDSM NDSC: HCPCS

## 2021-01-22 PROCEDURE — 71045 X-RAY EXAM CHEST 1 VIEW: CPT

## 2021-01-22 PROCEDURE — 80053 COMPREHEN METABOLIC PANEL: CPT | Performed by: EMERGENCY MEDICINE

## 2021-01-22 RX ORDER — PREGABALIN 100 MG/1
100 CAPSULE ORAL 3 TIMES DAILY
COMMUNITY
End: 2021-08-17

## 2021-01-22 RX ORDER — PAROXETINE 10 MG/1
10 TABLET, FILM COATED ORAL DAILY
COMMUNITY
End: 2021-08-17

## 2021-01-22 RX ADMIN — IOHEXOL 85 ML: 350 INJECTION, SOLUTION INTRAVENOUS at 11:05

## 2021-01-22 NOTE — ED PROCEDURE NOTE
PROCEDURE  ECG 12 Lead Documentation Only    Date/Time: 1/22/2021 10:14 AM  Performed by: Lori Santos DO  Authorized by: Lori Santos DO     ECG reviewed by me, the ED Provider: yes    Patient location:  ED  Interpretation:     Interpretation: abnormal    Rate:     ECG rate:  56    ECG rate assessment: bradycardic    Rhythm:     Rhythm: sinus rhythm    Ectopy:     Ectopy: none    ST segments:     ST segments:  Normal  T waves:     T waves: normal           Lori Santos DO  01/22/21 1015

## 2021-01-22 NOTE — PROGRESS NOTES
[address]        NAME: Zack Tristan is a 44 y o  female  : 1970    MRN: [id number]  DATE: 2021  TIME: 9:00 AM    Assessment and Plan   Numbness and tingling in left arm [R20 0, R20 2]  1  Numbness and tingling in left arm     2  Lightheadedness     3  Nausea       Patient Instructions     Pt sent to ER for further evaluation and workup of new onset L arm numbness and tingling  She was agreeable and noted comfort driving  All questions answered  Precautions given  Chief Complaint     Chief Complaint   Patient presents with    Fatigue     left arm weakness, numbness and nausea      History of Present Illness     64-year-old female presenting with complaint of numbness and tingling of the left arm  States she awoke at approximately 4-5 a m  This morning with a heaviness and aching of the left arm associated with numbness and tingling extending from the shoulder to the hand  This sensation has remained constant though has slightly decreased in severity since then  Reports lightheadedness, fatigue, and nausea  Denies any headaches, room spinning dizziness, change in vision or hearing, chest pain, palpitations, or vomiting  She denies any muscular weakness  Reports a slight aching pain in the shoulders but otherwise denies any neck or back pain  No recent injury, trauma, or change in activity to cause this pain  Reports a similar episode that occurred approximately 1 week ago, which lasted for up to 1 hour  She did not seek care for this at that time  Reports past medical history of hyperlipidemia but does not currently take anything for this  She denies any history of MI, stroke, cardiac, or neurological history  Family history positive for stroke and MI  No treatments tried  Denies any recent injury  Review of Systems   Review of Systems   Constitutional: Negative for chills, diaphoresis and fever  Eyes: Negative for visual disturbance     Respiratory: Negative for cough, chest tightness, shortness of breath and wheezing  Cardiovascular: Negative for chest pain and palpitations  Gastrointestinal: Positive for nausea  Negative for abdominal pain and vomiting  Musculoskeletal: Negative for back pain and neck pain  Neurological: Positive for light-headedness and numbness  Negative for syncope, speech difficulty, weakness and headaches  Current Medications       Current Outpatient Medications:     Calcium 500-100 MG-UNIT CHEW, Calcium 500, Disp: , Rfl:     Cholecalciferol (VITAMIN D3) 1000 UNIT/SPRAY LIQD, Vitamin D3, Disp: , Rfl:     PARoxetine (PAXIL) 10 mg tablet, Take 10 mg by mouth daily, Disp: , Rfl:     pregabalin (LYRICA) 100 mg capsule, Take 100 mg by mouth 3 (three) times a day, Disp: , Rfl:     clonazePAM (KlonoPIN) 0 5 mg tablet, Take 1 tablet (0 5 mg total) by mouth every 8 (eight) hours as needed for seizures prn (Patient not taking: Reported on 1/22/2021), Disp: 30 tablet, Rfl: 1    DULoxetine (CYMBALTA) 30 mg delayed release capsule, Take 1 capsule (30 mg total) by mouth daily (Patient not taking: Reported on 1/22/2021), Disp: 90 capsule, Rfl: 1    halobetasol (ULTRAVATE) 0 05 % cream, halobetasol propionate 0 05 % topical cream, Disp: , Rfl:     valACYclovir (VALTREX) 1,000 mg tablet, 2 tabs at earliest onset of cold sore, repeat once in 12 hours (Patient not taking: Reported on 1/22/2021), Disp: 40 tablet, Rfl: 2    Current Allergies     Allergies as of 01/22/2021 - Reviewed 01/22/2021   Allergen Reaction Noted    Penicillins  09/07/2019            The following portions of the patient's history were reviewed and updated as appropriate: allergies, current medications, past family history, past medical history, past social history, past surgical history and problem list      Past Medical History:   Diagnosis Date    Anxiety     Disease of thyroid gland        No past surgical history on file      Family History   Problem Relation Age of Onset    Arthritis Family      Medications have been verified  Objective   /58   Pulse 61   Temp (!) 96 °F (35 6 °C)   Resp 18   Ht 5' 5" (1 651 m)   Wt 72 7 kg (160 lb 3 2 oz)   SpO2 99%   BMI 26 66 kg/m²   No LMP recorded  Patient is perimenopausal      EKG: NSR  64 BPM  Physical Exam     Physical Exam  Vitals signs and nursing note reviewed  Constitutional:       General: She is not in acute distress  Appearance: She is well-developed  She is not ill-appearing or diaphoretic  HENT:      Head: Normocephalic and atraumatic  Eyes:      General: Lids are normal       Conjunctiva/sclera: Conjunctivae normal    Cardiovascular:      Rate and Rhythm: Normal rate and regular rhythm  Pulmonary:      Effort: Pulmonary effort is normal       Breath sounds: Normal breath sounds  No decreased breath sounds, wheezing, rhonchi or rales  Skin:     General: Skin is warm and dry  Neurological:      General: No focal deficit present  Mental Status: She is alert  She is not disoriented  Cranial Nerves: Cranial nerves are intact  Coordination: Coordination normal       Gait: Gait normal    Psychiatric:         Behavior: Behavior normal  Behavior is cooperative  Thought Content:  Thought content normal          Judgment: Judgment normal

## 2021-01-22 NOTE — ED PROVIDER NOTES
History  Chief Complaint   Patient presents with    Arm Pain     left arm/shoulder/neck pain started this morning around 0400  States there was tingling in her fingertips  Went to urgent care and sent in  Through triage it came to light patient has had issues with this arm since last Thursday and has not fully resolved  Patient presents for left arm pain and numbness  Patient states she knows the pain in her left arm going from the shoulder down to the hand around 4:00 a m  This morning  Patient states she also had tingling in the left hand involves the whole hand and fingers  Denies any headache or chest pain or shortness of breath  Patient states when she got up she felt lightheaded and felt like she might pass out  Patient reports she has similar episode last Thursday which was 8 days ago  States the symptoms did improve but that she never really felt right in the arm afterwards  Sore appears symptoms got acutely worse this morning but may have never resulted from last Thursday  States symptoms have improved since onset 4:00 a m  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 Taking?    Calcium 500-100 MG-UNIT CHEW   Yes No   Sig: Calcium 500   Cholecalciferol (VITAMIN D3) 1000 UNIT/SPRAY LIQD   Yes No   Sig: Vitamin D3   PARoxetine (PAXIL) 10 mg tablet   Yes No   Sig: Take 10 mg by mouth daily   halobetasol (ULTRAVATE) 0 05 % cream   Yes No   Sig: halobetasol propionate 0 05 % topical cream   pregabalin (LYRICA) 100 mg capsule   Yes No   Sig: Take 100 mg by mouth 3 (three) times a day      Facility-Administered Medications: None       Past Medical History:   Diagnosis Date    Anxiety     Disease of thyroid gland        History reviewed  No pertinent surgical history  Family History   Problem Relation Age of Onset    Arthritis Family      I have reviewed and agree with the history as documented  E-Cigarette/Vaping    E-Cigarette Use Never User      E-Cigarette/Vaping Substances    Nicotine No     THC No     CBD No     Flavoring No     Other No     Unknown No      Social History     Tobacco Use    Smoking status: Never Smoker    Smokeless tobacco: Never Used   Substance Use Topics    Alcohol use: No    Drug use: No       Review of Systems   Constitutional: Negative for chills and fever  HENT: Negative for congestion and sore throat  Eyes: Negative for photophobia and visual disturbance  Respiratory: Negative for cough and shortness of breath  Cardiovascular: Negative for chest pain and palpitations  Gastrointestinal: Negative for abdominal pain, nausea and vomiting  Genitourinary: Negative for difficulty urinating and dysuria  Musculoskeletal: Positive for arthralgias  Negative for back pain and neck pain  Skin: Negative for rash  Neurological: Positive for light-headedness and numbness  Negative for dizziness, tremors, syncope, facial asymmetry, speech difficulty, weakness and headaches  All other systems reviewed and are negative  Physical Exam  Physical Exam  Vitals signs and nursing note reviewed  Constitutional:       General: She is not in acute distress  Appearance: Normal appearance  HENT:      Head: Atraumatic  Mouth/Throat:      Mouth: Mucous membranes are moist       Pharynx: Oropharynx is clear  No oropharyngeal exudate  Eyes:      Extraocular Movements: Extraocular movements intact  Pupils: Pupils are equal, round, and reactive to light  Neck:      Musculoskeletal: Normal range of motion and neck supple  No muscular tenderness  Cardiovascular:      Rate and Rhythm: Normal rate and regular rhythm  Pulses: Normal pulses  Pulmonary:      Effort: Pulmonary effort is normal  No respiratory distress  Breath sounds: Normal breath sounds  No wheezing or rhonchi  Abdominal:      General: Abdomen is flat  Bowel sounds are normal  There is no distension  Tenderness: There is no abdominal tenderness  There is no guarding or rebound  Musculoskeletal: Normal range of motion  Right lower leg: No edema  Left lower leg: No edema  Comments: Normal range of motion of the left shoulder no direct tenderness on exam, no midline tenderness of the neck hand  and strength in left arm her normal good distal pulses sensation and cap refill   Skin:     Capillary Refill: Capillary refill takes less than 2 seconds  Findings: No rash  Neurological:      General: No focal deficit present  Mental Status: She is alert and oriented to person, place, and time  Cranial Nerves: No cranial nerve deficit  Sensory: No sensory deficit  Motor: No weakness        Coordination: Coordination normal       Gait: Gait normal          Vital Signs  ED Triage Vitals   Temperature Pulse Respirations Blood Pressure SpO2   01/22/21 0944 01/22/21 0944 01/22/21 0944 01/22/21 0944 01/22/21 0944   98 6 °F (37 °C) 63 16 161/65 100 %      Temp Source Heart Rate Source Patient Position - Orthostatic VS BP Location FiO2 (%)   01/22/21 0944 01/22/21 0944 01/22/21 0944 01/22/21 0944 --   Oral Monitor Lying Left arm       Pain Score       01/22/21 0955       3           Vitals:    01/22/21 0944 01/22/21 0955 01/22/21 1228   BP: 161/65 140/69 144/74   Pulse: 63 57 57   Patient Position - Orthostatic VS: Lying  Lying         Visual Acuity      ED Medications  Medications   iohexol (OMNIPAQUE) 350 MG/ML injection (SINGLE-DOSE) 85 mL (85 mL Intravenous Given 1/22/21 1105)       Diagnostic Studies  Results Reviewed     Procedure Component Value Units Date/Time    Troponin I [[id number]]  (Normal) Collected: 01/22/21 0955    Lab Status: Final result Specimen: Blood from Arm, Right Updated: 01/22/21 1027     Troponin I <0 02 ng/mL     Comprehensive metabolic panel [[id number]]  (Abnormal) Collected: 01/22/21 0955    Lab Status: Final result Specimen: Blood from Arm, Right Updated: 01/22/21 1022     Sodium 139 mmol/L      Potassium 4 3 mmol/L      Chloride 106 mmol/L      CO2 27 mmol/L      ANION GAP 6 mmol/L      BUN 17 mg/dL      Creatinine 0 73 mg/dL      Glucose 108 mg/dL      Calcium 8 2 mg/dL      Corrected Calcium 8 8 mg/dL      AST 22 U/L      ALT 22 U/L      Alkaline Phosphatase 59 U/L      Total Protein 7 0 g/dL      Albumin 3 3 g/dL      Total Bilirubin 0 40 mg/dL      eGFR 95 ml/min/1 73sq m     Narrative:      [place] guidelines for Chronic Kidney Disease (CKD):     Stage 1 with normal or high GFR (GFR > 90 mL/min/1 73 square meters)    Stage 2 Mild CKD (GFR = 60-89 mL/min/1 73 square meters)    Stage 3A Moderate CKD (GFR = 45-59 mL/min/1 73 square meters)    Stage 3B Moderate CKD (GFR = 30-44 mL/min/1 73 square meters)    Stage 4 Severe CKD (GFR = 15-29 mL/min/1 73 square meters)    Stage 5 End Stage CKD (GFR <15 mL/min/1 73 square meters)  Note: GFR calculation is accurate only with a steady state creatinine    Protime-INR [[id number]]  (Normal) Collected: 01/22/21 0955    Lab Status: Final result Specimen: Blood from Arm, Right Updated: 01/22/21 1019     Protime 12 7 seconds      INR 0 96    APTT [[id number]]  (Normal) Collected: 01/22/21 0955    Lab Status: Final result Specimen: Blood from Arm, Right Updated: 01/22/21 1019     PTT 29 seconds     CBC and differential [[id number]]  (Abnormal) Collected: 01/22/21 0955    Lab Status: Final result Specimen: Blood from Arm, Right Updated: 01/22/21 1007     WBC 4 33 Thousand/uL      RBC 3 91 Million/uL      Hemoglobin 12 5 g/dL      Hematocrit 38 1 %      MCV 97 fL MCH 32 0 pg      MCHC 32 8 g/dL      RDW 12 5 %      MPV 9 5 fL      Platelets 824 Thousands/uL      Neutrophils Relative 53 %      Lymphocytes Relative 33 %      Monocytes Relative 9 %      Eosinophils Relative 3 %      Basophils Relative 2 %      Neutrophils Absolute 2 34 Thousands/µL      Lymphocytes Absolute 1 41 Thousands/µL      Monocytes Absolute 0 38 Thousand/µL      Eosinophils Absolute 0 12 Thousand/µL      Basophils Absolute 0 08 Thousands/µL                  CTA head and neck with and without contrast   Final Result by Reji Sotomayor MD (01/22 1138)      No acute intracranial disease  Normal CTA, No large vessel flow restrictive disease within the head or neck  Workstation performed: [id number]         XR chest 1 view portable   Final Result by Desmond Corbin DO (01/22 1037)   No acute cardiopulmonary disease  Workstation performed: [id number]                    Procedures  Procedures         ED Course             HEART Risk Score      Most Recent Value   Heart Score Risk Calculator   History  0 Filed at: 01/22/2021 1053   ECG  0 Filed at: 01/22/2021 1053   Age  1 Filed at: 01/22/2021 1053   Risk Factors  0 Filed at: 01/22/2021 1053   Troponin  0 Filed at: 01/22/2021 1053   HEART Score  1 Filed at: 01/22/2021 1053          Stroke Assessment     Row Name 01/22/21 1053             NIH Stroke Scale    Interval  Baseline      Level of Consciousness (1a )  0      LOC Questions (1b )  0      LOC Commands (1c )  0      Best Gaze (2 )  0      Visual (3 )  0      Facial Palsy (4 )  0      Motor Arm, Left (5a )  0      Motor Arm, Right (5b )  0      Motor Leg, Left (6a )  0      Motor Leg, Right (6b )  0      Limb Ataxia (7 )  0      Sensory (8 )  0      Best Language (9 )  0      Dysarthria (10 )  0      Extinction and Inattention (11 ) (Formerly Neglect)  0      Total  0          First Filed Value   TPA Decision  Patient not a TPA candidate     Patient is not a candidate options  Symptoms resolved/clearly non disabling , Unclear time of onset outside appropriate time window  MDM  Number of Diagnoses or Management Options  Left arm pain:   Radiculopathy:   Diagnosis management comments: Pulse ox 100% on room air indicating adequate oxygenation  CXR: as read by me    Patient currently has an NIH stroke score of 0 there is also unclear onset of symptoms as patient did not feel like she returned back to normal 8 days ago   Differential includes but is not limited to TIA peripheral neuropathy musculoskeletal pains cardiac disease  ER workup was unremarkable patient was stable on repeat exams however follow-up palpation doubt this is the stroke as patient's persistent for over a week and no findings on CT CTA  Amount and/or Complexity of Data Reviewed  Clinical lab tests: ordered and reviewed  Tests in the radiology section of CPT®: ordered and reviewed  Decide to obtain previous medical records or to obtain history from someone other than the patient: yes  Review and summarize past medical records: yes  Independent visualization of images, tracings, or specimens: yes    Patient Progress  Patient progress: stable      Disposition  Final diagnoses:   Left arm pain   Radiculopathy     Time reflects when diagnosis was documented in both MDM as applicable and the Disposition within this note     Time User Action Codes Description Comment    1/22/2021 12:11 PM Marline Powers Add [J01 239] Left arm pain     1/22/2021 12:11 PM Marline Powers Add [M50 44] Radiculopathy       ED Disposition     ED Disposition Condition Date/Time Comment    Discharge Stable Fri Jan 22, 2021 12:11 PM Roma Nina discharge to home/self care              Follow-up Information     Follow up With Specialties Details Why [id number] [facility], [address] In 1 week  [address] [facility]      Leelee Gipson MD Orthopedic Surgery In 1 week  [place] [address]  [phone]            Discharge Medication List as of 1/22/2021 12:12 PM      CONTINUE these medications which have NOT CHANGED    Details   Calcium 500-100 MG-UNIT CHEW Calcium 500, Historical Med      Cholecalciferol (VITAMIN D3) 1000 UNIT/SPRAY LIQD Vitamin D3, Historical Med      halobetasol (ULTRAVATE) 0 05 % cream halobetasol propionate 0 05 % topical cream, Historical Med      PARoxetine (PAXIL) 10 mg tablet Take 10 mg by mouth daily, Historical Med      pregabalin (LYRICA) 100 mg capsule Take 100 mg by mouth 3 (three) times a day, Historical Med           No discharge procedures on file      PDMP Review     None          ED Provider  Electronically Signed by           Cirilo Figueroa DO  01/22/21 6321

## 2021-01-23 LAB
ATRIAL RATE: 56 BPM
P AXIS: 39 DEGREES
PR INTERVAL: 164 MS
QRS AXIS: 21 DEGREES
QRSD INTERVAL: 102 MS
QT INTERVAL: 482 MS
QTC INTERVAL: 465 MS
T WAVE AXIS: 22 DEGREES
VENTRICULAR RATE: 56 BPM

## 2021-01-23 PROCEDURE — 93010 ELECTROCARDIOGRAM REPORT: CPT | Performed by: INTERNAL MEDICINE

## 2021-01-27 ENCOUNTER — VBI (OUTPATIENT)
Dept: FAMILY MEDICINE CLINIC | Facility: CLINIC | Age: 51
End: 2021-01-27

## 2021-01-27 NOTE — TELEPHONE ENCOUNTER
[facility]    [facility] Information     Ed visit date: 01/22/2021  Diagnosis Description: Left arm pain; Radiculopathy  In Network? Yes Jin Begum  Discharge status: Home  Discharged with meds ? No  Number of ED visits to date: 0  ED Severity:NA     Outreach Information    Outreach successful: Yes 1  Date letter mailed:NA  Date Finalized:01/27/2021    Care Coordination    Follow up appointment with pcp: no no  Transportation issues ? NA    Value Base Outreach    S/W patient who states she is much better, I'm ok  Patient aware she may call [facility] if any further problems with this issue

## 2021-01-28 LAB
ATRIAL RATE: 64 BPM
QRS AXIS: 19 DEGREES
QRSD INTERVAL: 98 MS
QT INTERVAL: 446 MS
QTC INTERVAL: 460 MS
T WAVE AXIS: 8 DEGREES
VENTRICULAR RATE: 64 BPM

## 2021-01-28 PROCEDURE — 93010 ELECTROCARDIOGRAM REPORT: CPT | Performed by: INTERNAL MEDICINE

## 2021-07-26 DIAGNOSIS — Z12.31 ENCOUNTER FOR SCREENING MAMMOGRAM FOR MALIGNANT NEOPLASM OF BREAST: Primary | ICD-10-CM

## 2021-07-31 DIAGNOSIS — F41.9 ANXIETY AND DEPRESSION: ICD-10-CM

## 2021-07-31 DIAGNOSIS — F32.A ANXIETY AND DEPRESSION: ICD-10-CM

## 2021-08-02 RX ORDER — CLONAZEPAM 0.5 MG/1
TABLET ORAL
Qty: 30 TABLET | Refills: 1 | Status: SHIPPED | OUTPATIENT
Start: 2021-08-02

## 2021-08-02 NOTE — TELEPHONE ENCOUNTER
Medication is d/c in patient's chart  LM for patient to call office to see if she requested this refill  If she did, she will need f/u appointment as she has not been seen since 2020    Shon Gallegos MA

## 2021-08-14 RX ORDER — PROGESTERONE 100 MG/1
CAPSULE ORAL
COMMUNITY
End: 2022-03-23

## 2021-08-14 RX ORDER — PROMETHAZINE HYDROCHLORIDE AND CODEINE PHOSPHATE 6.25; 1 MG/5ML; MG/5ML
SYRUP ORAL
COMMUNITY
End: 2021-08-17

## 2021-08-14 RX ORDER — AZELASTINE 1 MG/ML
SPRAY, METERED NASAL EVERY 12 HOURS
COMMUNITY
End: 2022-03-19

## 2021-08-17 ENCOUNTER — OFFICE VISIT (OUTPATIENT)
Dept: FAMILY MEDICINE CLINIC | Facility: CLINIC | Age: 51
End: 2021-08-17
Payer: COMMERCIAL

## 2021-08-17 VITALS
DIASTOLIC BLOOD PRESSURE: 70 MMHG | HEIGHT: 64 IN | SYSTOLIC BLOOD PRESSURE: 126 MMHG | HEART RATE: 68 BPM | OXYGEN SATURATION: 95 % | TEMPERATURE: 98.3 F | BODY MASS INDEX: 27.14 KG/M2 | RESPIRATION RATE: 16 BRPM | WEIGHT: 159 LBS

## 2021-08-17 DIAGNOSIS — R53.83 OTHER FATIGUE: ICD-10-CM

## 2021-08-17 DIAGNOSIS — Z13.6 SCREENING FOR CARDIOVASCULAR, RESPIRATORY, AND GENITOURINARY DISEASES: ICD-10-CM

## 2021-08-17 DIAGNOSIS — Z00.00 HEALTHCARE MAINTENANCE: Primary | ICD-10-CM

## 2021-08-17 DIAGNOSIS — Z12.11 COLON CANCER SCREENING: ICD-10-CM

## 2021-08-17 DIAGNOSIS — R79.89 LOW TSH LEVEL: ICD-10-CM

## 2021-08-17 DIAGNOSIS — Z13.89 SCREENING FOR CARDIOVASCULAR, RESPIRATORY, AND GENITOURINARY DISEASES: ICD-10-CM

## 2021-08-17 DIAGNOSIS — Z13.1 SCREENING FOR DIABETES MELLITUS (DM): ICD-10-CM

## 2021-08-17 DIAGNOSIS — Z13.83 SCREENING FOR CARDIOVASCULAR, RESPIRATORY, AND GENITOURINARY DISEASES: ICD-10-CM

## 2021-08-17 DIAGNOSIS — F41.1 GAD (GENERALIZED ANXIETY DISORDER): ICD-10-CM

## 2021-08-17 PROCEDURE — 1036F TOBACCO NON-USER: CPT | Performed by: FAMILY MEDICINE

## 2021-08-17 PROCEDURE — 3008F BODY MASS INDEX DOCD: CPT | Performed by: FAMILY MEDICINE

## 2021-08-17 PROCEDURE — 99396 PREV VISIT EST AGE 40-64: CPT | Performed by: FAMILY MEDICINE

## 2021-08-17 PROCEDURE — 3725F SCREEN DEPRESSION PERFORMED: CPT | Performed by: FAMILY MEDICINE

## 2021-08-17 RX ORDER — ASCORBIC ACID 250 MG
250 TABLET ORAL DAILY
COMMUNITY

## 2021-08-17 RX ORDER — DULOXETIN HYDROCHLORIDE 30 MG/1
30 CAPSULE, DELAYED RELEASE ORAL DAILY
Qty: 90 CAPSULE | Refills: 1 | Status: SHIPPED | OUTPATIENT
Start: 2021-08-17 | End: 2022-03-23

## 2021-08-17 NOTE — PROGRESS NOTES
Assessment/Plan:    No problem-specific Assessment & Plan notes found for this encounter  cpe    Low TSH in past, may need endo referral pending labs    MIKE, was getting paxil and lyrica from doctor in [address] she wean lyrica  Dc paxil, wt gain issues  Start cymbalta, f/u 1m if no better  Offer psychiatry     Diagnoses and all orders for this visit:    Healthcare maintenance    Screening for cardiovascular, respiratory, and genitourinary diseases  -     Lipid Panel with Direct LDL reflex; Future    Screening for diabetes mellitus (DM)  -     Comprehensive metabolic panel; Future    Low TSH level  -     TSH, 3rd generation; Future  -     T4, free; Future  -     T3; Future    Other fatigue  -     Vitamin D 25 hydroxy; Future  -     CBC and differential; Future    Colon cancer screening  -     Ambulatory referral to Gastroenterology; Future    MIKE (generalized anxiety disorder)  -     DULoxetine (CYMBALTA) 30 mg delayed release capsule; Take 1 capsule (30 mg total) by mouth daily  -     Ambulatory referral to Psychology; Future    Other orders  -     Discontinue: promethazine-codeine (PHENERGAN WITH CODEINE) 6 25-10 mg/5 mL syrup; promethazine 6 25 mg-codeine 10 mg/5 mL syrup (Patient not taking: Reported on 8/17/2021)  -     Progesterone 100 MG CAPS; progesterone micronized 100 mg capsule  -     azelastine (ASTELIN) 0 1 % nasal spray; Every 12 hours (Patient not taking: Reported on 8/17/2021)  -     ascorbic acid (VITAMIN C) 250 MG tablet; Take 250 mg by mouth daily  -     Omega-3 Fatty Acids (Fish Oil) 600 MG CAPS; Take by mouth      Return in about 1 month (around 9/17/2021) for Recheck  Subjective:      Patient ID: Debora Epps is a 44 y o  female      Chief Complaint   Patient presents with    Physical Exam     sas/cma       HPI  Daughter in cardiology fellowship    Not eating healthy  Some wt gain  Some exercise  Some biking and weights    Has anxiety   Wants to change paxil  Wt gain noted  Was seeing psych in [place] who also gave lyrica    The following portions of the patient's history were reviewed and updated as appropriate: allergies, current medications, past family history, past medical history, past social history, past surgical history and problem list     Review of Systems   Constitutional: Negative for fever  Respiratory: Negative for shortness of breath  Psychiatric/Behavioral: The patient is nervous/anxious  Current Outpatient Medications   Medication Sig Dispense Refill    ascorbic acid (VITAMIN C) 250 MG tablet Take 250 mg by mouth daily      Calcium 500-100 MG-UNIT CHEW Calcium 500      Cholecalciferol (VITAMIN D3) 1000 UNIT/SPRAY LIQD Vitamin D3      clonazePAM (KlonoPIN) 0 5 mg tablet TAKE ONE TABLET BY MOUTH EVERY 8 HOURS AS NEEDED FOR SEIZURES 30 tablet 1    Omega-3 Fatty Acids (Fish Oil) 600 MG CAPS Take by mouth      Progesterone 100 MG CAPS progesterone micronized 100 mg capsule      azelastine (ASTELIN) 0 1 % nasal spray Every 12 hours (Patient not taking: Reported on 8/17/2021)      DULoxetine (CYMBALTA) 30 mg delayed release capsule Take 1 capsule (30 mg total) by mouth daily 90 capsule 1     No current facility-administered medications for this visit  Objective:    /70   Pulse 68   Temp 98 3 °F (36 8 °C)   Resp 16   Ht 5' 3 5" (1 613 m)   Wt 72 1 kg (159 lb)   LMP 08/12/2021 (Exact Date)   SpO2 95%   BMI 27 72 kg/m²        Physical Exam  Vitals and nursing note reviewed  Constitutional:       General: She is not in acute distress  Appearance: She is well-developed  She is not ill-appearing  HENT:      Head: Normocephalic  Right Ear: Tympanic membrane normal       Left Ear: Tympanic membrane normal    Eyes:      General: No scleral icterus  Conjunctiva/sclera: Conjunctivae normal    Cardiovascular:      Rate and Rhythm: Normal rate and regular rhythm  Heart sounds: No murmur heard       Pulmonary:      Effort: Pulmonary effort is normal  No respiratory distress  Breath sounds: No wheezing or rales  Abdominal:      General: There is no distension  Palpations: Abdomen is soft  Tenderness: There is no abdominal tenderness  Musculoskeletal:         General: No deformity  Cervical back: Neck supple  Skin:     General: Skin is warm and dry  Coloration: Skin is not pale  Neurological:      Mental Status: She is alert  Motor: No weakness  Gait: Gait normal    Psychiatric:         Mood and Affect: Mood normal          Behavior: Behavior normal          Thought Content: Thought content normal          BMI Counseling: Body mass index is 27 72 kg/m²  The BMI is above normal  Nutrition recommendations include decreasing portion sizes and moderation in carbohydrate intake  Exercise recommendations include exercising 3-5 times per week  No pharmacotherapy was ordered                Francia Sanchez DO

## 2021-08-18 ENCOUNTER — TELEPHONE (OUTPATIENT)
Dept: ADMINISTRATIVE | Facility: OTHER | Age: 51
End: 2021-08-18

## 2021-08-18 NOTE — LETTER
Procedure Request Form: Cervical Cancer Screening      Date Requested: 21  Patient: Arleth Chapman  Patient : 1970   Referring Provider: Marion Jernigan, DO        Date of Procedure ______________________________       The above patient has informed us that they have completed their   most recent Cervical Cancer Screening at your facility  Please complete   this form and attach all corresponding procedure reports/results  Comments __________________________________________________________  ____________________________________________________________________  ____________________________________________________________________  ____________________________________________________________________    Facility Completing Procedure _________________________________________    Form Completed By (print name) _______________________________________      Signature __________________________________________________________      These reports are needed for  compliance    Please fax this completed form and a copy of the procedure report to our office located at [place] [address] as soon as possible to [phone] mello Youngblood: Phone [phone]    We thank you for your assistance in treating our mutual patient

## 2021-08-18 NOTE — TELEPHONE ENCOUNTER
----- Message from [facility] sent at 8/17/2021  3:12 PM EDT -----  08/17/21 3:12 PM    Hello, our patient Amanda Garrison has had Pap Smear (HPV) aka Cervical Cancer Screening completed/performed  Please assist in updating the patient chart by making an External outreach to Dr Elias Mccurdy facility located in [place], [place]  The date of service is within the last year      Thank you,  Micheline Rowland  [facility]

## 2021-08-18 NOTE — TELEPHONE ENCOUNTER
Upon review of the In Basket request and the patient's chart, initial outreach has been made via fax, please see Contacts section for details       Thank you  Zachariah Greenfield

## 2021-08-30 NOTE — TELEPHONE ENCOUNTER
As a follow-up, a second attempt has been made for outreach via fax, please see Contacts section for details      Thank you  Micheal Adler

## 2021-09-01 ENCOUNTER — TELEPHONE (OUTPATIENT)
Dept: GASTROENTEROLOGY | Facility: CLINIC | Age: 51
End: 2021-09-01

## 2021-09-01 NOTE — TELEPHONE ENCOUNTER
Received order from Dr Gadiel Madrigal for pt to be scheduled for colon screening  Pt is a new pt to us and would need to be asked oa questions or scheduled for new pt ov  I lmom for pt to please call back to schedule an appt with Dr Gadiel Madrigal  Will call pt again in one week if do not hear back from her

## 2021-09-08 NOTE — TELEPHONE ENCOUNTER
As a final attempt, a third outreach has been made via telephone call  The outcome of the telephone call was a fax request form to be sent to Office  Please see Contacts section for details  This encounter will be closed and completed by end of day  Should we receive the requested information because of previous outreach attempts, the requested patient's chart will be updated appropriately       Thank you  Kobi Ballard

## 2021-09-08 NOTE — TELEPHONE ENCOUNTER
I lmom for pt to please call back to schedule an appt per Dr Beth Weiner  Will call pt again in two weeks if do not hear back from her

## 2021-09-09 NOTE — TELEPHONE ENCOUNTER
Upon review of the In Basket request we were able to locate, review, and update the patient chart as requested for Pap Smear (HPV) aka Cervical Cancer Screening  Any additional questions or concerns should be emailed to the Practice Liaisons via [email or web address] email, please do not reply via In Basket      Thank you  Rodolfo Holley

## 2021-09-22 NOTE — TELEPHONE ENCOUNTER
I lmom for pt to please call back to schedule an appt per Dr Charles Jose  Will wait for pt's call back at this time

## 2021-12-10 ENCOUNTER — TELEMEDICINE (OUTPATIENT)
Dept: FAMILY MEDICINE CLINIC | Facility: CLINIC | Age: 51
End: 2021-12-10
Payer: COMMERCIAL

## 2021-12-10 DIAGNOSIS — U07.1 COVID-19 VIRUS INFECTION: ICD-10-CM

## 2021-12-10 DIAGNOSIS — B34.9 VIRAL INFECTION, UNSPECIFIED: Primary | ICD-10-CM

## 2021-12-10 PROCEDURE — U0005 INFEC AGEN DETEC AMPLI PROBE: HCPCS | Performed by: FAMILY MEDICINE

## 2021-12-10 PROCEDURE — 99213 OFFICE O/P EST LOW 20 MIN: CPT | Performed by: FAMILY MEDICINE

## 2021-12-10 PROCEDURE — U0003 INFECTIOUS AGENT DETECTION BY NUCLEIC ACID (DNA OR RNA); SEVERE ACUTE RESPIRATORY SYNDROME CORONAVIRUS 2 (SARS-COV-2) (CORONAVIRUS DISEASE [COVID-19]), AMPLIFIED PROBE TECHNIQUE, MAKING USE OF HIGH THROUGHPUT TECHNOLOGIES AS DESCRIBED BY CMS-2020-01-R: HCPCS | Performed by: FAMILY MEDICINE

## 2021-12-10 PROCEDURE — 1036F TOBACCO NON-USER: CPT | Performed by: FAMILY MEDICINE

## 2021-12-10 RX ORDER — METHYLPREDNISOLONE 4 MG/1
TABLET ORAL
Qty: 21 TABLET | Refills: 0 | Status: SHIPPED | OUTPATIENT
Start: 2021-12-10 | End: 2021-12-16

## 2021-12-10 RX ORDER — ALBUTEROL SULFATE 90 UG/1
2 AEROSOL, METERED RESPIRATORY (INHALATION) EVERY 6 HOURS PRN
Qty: 18 G | Refills: 0 | Status: SHIPPED | OUTPATIENT
Start: 2021-12-10 | End: 2022-03-23

## 2022-01-25 DIAGNOSIS — B00.2 HERPES GINGIVOSTOMATITIS: Primary | ICD-10-CM

## 2022-01-25 RX ORDER — VALACYCLOVIR HYDROCHLORIDE 1 G/1
TABLET, FILM COATED ORAL
Qty: 40 TABLET | Refills: 2 | Status: SHIPPED | OUTPATIENT
Start: 2022-01-25 | End: 2022-04-25

## 2022-03-19 RX ORDER — PIMECROLIMUS 10 MG/G
CREAM TOPICAL
COMMUNITY
End: 2022-03-23

## 2022-03-23 ENCOUNTER — OFFICE VISIT (OUTPATIENT)
Dept: FAMILY MEDICINE CLINIC | Facility: CLINIC | Age: 52
End: 2022-03-23
Payer: COMMERCIAL

## 2022-03-23 VITALS
BODY MASS INDEX: 27.83 KG/M2 | RESPIRATION RATE: 16 BRPM | TEMPERATURE: 98 F | HEART RATE: 72 BPM | HEIGHT: 64 IN | DIASTOLIC BLOOD PRESSURE: 60 MMHG | SYSTOLIC BLOOD PRESSURE: 120 MMHG | WEIGHT: 163 LBS

## 2022-03-23 DIAGNOSIS — J30.1 ALLERGIC RHINITIS DUE TO POLLEN, UNSPECIFIED SEASONALITY: ICD-10-CM

## 2022-03-23 DIAGNOSIS — L30.1 DYSHIDROTIC ECZEMA: Primary | ICD-10-CM

## 2022-03-23 DIAGNOSIS — Z01.84 IMMUNITY STATUS TESTING: ICD-10-CM

## 2022-03-23 DIAGNOSIS — F41.1 GAD (GENERALIZED ANXIETY DISORDER): ICD-10-CM

## 2022-03-23 PROBLEM — R53.83 OTHER FATIGUE: Status: RESOLVED | Noted: 2021-08-17 | Resolved: 2022-03-23

## 2022-03-23 PROBLEM — U07.1 COVID-19 VIRUS INFECTION: Status: RESOLVED | Noted: 2021-12-10 | Resolved: 2022-03-23

## 2022-03-23 PROCEDURE — 3008F BODY MASS INDEX DOCD: CPT | Performed by: FAMILY MEDICINE

## 2022-03-23 PROCEDURE — 3725F SCREEN DEPRESSION PERFORMED: CPT | Performed by: FAMILY MEDICINE

## 2022-03-23 PROCEDURE — 99214 OFFICE O/P EST MOD 30 MIN: CPT | Performed by: FAMILY MEDICINE

## 2022-03-23 PROCEDURE — 1036F TOBACCO NON-USER: CPT | Performed by: FAMILY MEDICINE

## 2022-03-23 RX ORDER — MOMETASONE FUROATE 1 MG/G
OINTMENT TOPICAL
COMMUNITY

## 2022-03-23 RX ORDER — PAROXETINE HYDROCHLORIDE 20 MG/1
20 TABLET, FILM COATED ORAL DAILY
COMMUNITY

## 2022-03-23 RX ORDER — PREGABALIN 75 MG/1
75 CAPSULE ORAL DAILY
COMMUNITY

## 2022-03-23 RX ORDER — CLOTRIMAZOLE AND BETAMETHASONE DIPROPIONATE 10; .64 MG/G; MG/G
CREAM TOPICAL 2 TIMES DAILY
Qty: 45 G | Refills: 1 | Status: SHIPPED | OUTPATIENT
Start: 2022-03-23

## 2022-03-23 NOTE — PROGRESS NOTES
Assessment/Plan:    No problem-specific Assessment & Plan notes found for this encounter  Eczema  Suggest lotrisone with occlusion glove at night  Prevention discussed  Derm if no better    Pt wants covid Ab  Discussed limitations of use and not an assessment of immunity or definite exposure    MIKE unchanged  Continue paxil  Wt gain risk advised  She got paxil and lyrica from [place] I do not recommend lyrica and aware it is controlled and she should wean herself when she plans to stop    AR stable     Diagnoses and all orders for this visit:    Dyshidrotic eczema  -     clotrimazole-betamethasone (LOTRISONE) 1-0 05 % cream; Apply topically 2 (two) times a day Short term, sparingly to area: hands  -     Ambulatory referral to Dermatology; Future    Immunity status testing  -     SARS CoV 2 SEROLOGY (COVID 19) AB (IGG), IA; Future    MIKE (generalized anxiety disorder)    Allergic rhinitis due to pollen, unspecified seasonality    Other orders  -     Discontinue: mupirocin (BACTROBAN) 2 % ointment; mupirocin 2 % topical ointment   APPLY SPARINGLY TO AFFECTED AREA TWICE A DAY (Patient not taking: Reported on 3/23/2022)  -     Discontinue: pimecrolimus (ELIDEL) 1 % cream; pimecrolimus 1 % topical cream   APPLY TWICE DAILY TO AFFECTED AREAS ON FACE AS NEEDED (Patient not taking: Reported on 3/23/2022)  -     mometasone (ELOCON) 0 1 % ointment; mometasone 0 1 % topical ointment   APPLY A THIN LAYER TO THE AFFECTED AREA(S) BY TOPICAL ROUTE ONCE DAILY  -     PARoxetine (PAXIL) 20 mg tablet; Take 20 mg by mouth daily  -     pregabalin (LYRICA) 75 mg capsule; Take 75 mg by mouth in the morning        Return if symptoms worsen or fail to improve  Subjective:      Patient ID: Leticia Drummond is a 46 y o  female  Chief Complaint   Patient presents with    Rash     on both palsm, peeling, cracking skin   ac/lpn       HPI  Only hands  Not feet  Cracking, itchy, pain with fissuring, some bleeding  Washes hands often  Some cleaning   Gloves  Hot/sweaty  6 weeks especially  Used aloe vera w/o help    Allergist in past  Dr Junior Mcdowell, in [place]  Dx with ? Seen by him last week  Given mometasone cream  Not better after 1 week  No exposures known    Not on any po h1B currently    The following portions of the patient's history were reviewed and updated as appropriate: allergies, current medications, past family history, past medical history, past social history, past surgical history and problem list     Review of Systems   Constitutional: Negative for chills and fever  Current Outpatient Medications   Medication Sig Dispense Refill    ascorbic acid (VITAMIN C) 250 MG tablet Take 250 mg by mouth daily      Calcium 500-100 MG-UNIT CHEW Calcium 500      Cholecalciferol (VITAMIN D3) 1000 UNIT/SPRAY LIQD Vitamin D3      clonazePAM (KlonoPIN) 0 5 mg tablet TAKE ONE TABLET BY MOUTH EVERY 8 HOURS AS NEEDED FOR SEIZURES 30 tablet 1    mometasone (ELOCON) 0 1 % ointment mometasone 0 1 % topical ointment   APPLY A THIN LAYER TO THE AFFECTED AREA(S) BY TOPICAL ROUTE ONCE DAILY      PARoxetine (PAXIL) 20 mg tablet Take 20 mg by mouth daily      pregabalin (LYRICA) 75 mg capsule Take 75 mg by mouth in the morning      valACYclovir (VALTREX) 1,000 mg tablet TAKE 2 TABLETS AT EARLIEST ONSET OF COLD SORE REPEAT ONCE IN 12 HOURS 40 tablet 2    clotrimazole-betamethasone (LOTRISONE) 1-0 05 % cream Apply topically 2 (two) times a day Short term, sparingly to area: hands 45 g 1     No current facility-administered medications for this visit  Objective:    /60   Pulse 72   Temp 98 °F (36 7 °C)   Resp 16   Ht 5' 3 5" (1 613 m)   Wt 73 9 kg (163 lb)   BMI 28 42 kg/m²        Physical Exam  Vitals and nursing note reviewed  Constitutional:       General: She is not in acute distress  Appearance: She is well-developed  HENT:      Head: Normocephalic        Right Ear: Tympanic membrane normal       Left Ear: Tympanic membrane normal       Mouth/Throat:      Mouth: Mucous membranes are moist       Pharynx: No oropharyngeal exudate  Eyes:      General: No scleral icterus  Conjunctiva/sclera: Conjunctivae normal    Cardiovascular:      Rate and Rhythm: Normal rate and regular rhythm  Heart sounds: No murmur heard  Pulmonary:      Effort: Pulmonary effort is normal  No respiratory distress  Breath sounds: No wheezing  Abdominal:      Palpations: Abdomen is soft  Musculoskeletal:         General: No deformity  Cervical back: Neck supple  Right lower leg: No edema  Left lower leg: No edema  Skin:     General: Skin is warm and dry  Coloration: Skin is not jaundiced or pale  Findings: Rash present  Comments: Scaly fissured rash b/l palmar surfaces   Neurological:      Mental Status: She is alert  Motor: No weakness  Gait: Gait normal    Psychiatric:         Behavior: Behavior normal          Thought Content:  Thought content normal                 Garry Duane, DO

## 2022-03-23 NOTE — PATIENT INSTRUCTIONS
In place of the mometasone cream, please use a different combination cream called clotrimazole/betamethasone, apply thin coat at bedtime and cover with gloves to improve skin absorption for the next 7-10 days

## 2022-04-10 LAB
25(OH)D3 SERPL-MCNC: 85 NG/ML (ref 30–100)
ALBUMIN SERPL-MCNC: 3.8 G/DL (ref 3.6–5.1)
ALBUMIN/GLOB SERPL: 1.4 (CALC) (ref 1–2.5)
ALP SERPL-CCNC: 57 U/L (ref 37–153)
ALT SERPL-CCNC: 10 U/L (ref 6–29)
AST SERPL-CCNC: 16 U/L (ref 10–35)
BILIRUB SERPL-MCNC: 0.6 MG/DL (ref 0.2–1.2)
BUN SERPL-MCNC: 18 MG/DL (ref 7–25)
BUN/CREAT SERPL: ABNORMAL (CALC) (ref 6–22)
CALCIUM SERPL-MCNC: 9 MG/DL (ref 8.6–10.4)
CHLORIDE SERPL-SCNC: 105 MMOL/L (ref 98–110)
CHOLEST SERPL-MCNC: 209 MG/DL
CHOLEST/HDLC SERPL: 3.1 (CALC)
CO2 SERPL-SCNC: 31 MMOL/L (ref 20–32)
CREAT SERPL-MCNC: 0.69 MG/DL (ref 0.5–1.05)
GLOBULIN SER CALC-MCNC: 2.7 G/DL (CALC) (ref 1.9–3.7)
GLUCOSE SERPL-MCNC: 102 MG/DL (ref 65–99)
HDLC SERPL-MCNC: 67 MG/DL
LDLC SERPL CALC-MCNC: 129 MG/DL (CALC)
NONHDLC SERPL-MCNC: 142 MG/DL (CALC)
POTASSIUM SERPL-SCNC: 4.4 MMOL/L (ref 3.5–5.3)
PROT SERPL-MCNC: 6.5 G/DL (ref 6.1–8.1)
SARS-COV-2 IGG SERPL IA-ACNC: <1 INDEX
SL AMB EGFR AFRICAN AMERICAN: 115 ML/MIN/1.73M2
SL AMB EGFR NON AFRICAN AMERICAN: 99 ML/MIN/1.73M2
SODIUM SERPL-SCNC: 141 MMOL/L (ref 135–146)
T3 SERPL-MCNC: 104 NG/DL (ref 76–181)
T4 FREE SERPL-MCNC: 1 NG/DL (ref 0.8–1.8)
TRIGL SERPL-MCNC: 44 MG/DL
TSH SERPL-ACNC: 0.28 MIU/L

## 2022-10-12 PROBLEM — Z13.89 SCREENING FOR CARDIOVASCULAR, RESPIRATORY, AND GENITOURINARY DISEASES: Status: RESOLVED | Noted: 2021-08-17 | Resolved: 2022-10-12

## 2022-10-12 PROBLEM — Z13.6 SCREENING FOR CARDIOVASCULAR, RESPIRATORY, AND GENITOURINARY DISEASES: Status: RESOLVED | Noted: 2021-08-17 | Resolved: 2022-10-12

## 2022-10-12 PROBLEM — Z13.83 SCREENING FOR CARDIOVASCULAR, RESPIRATORY, AND GENITOURINARY DISEASES: Status: RESOLVED | Noted: 2021-08-17 | Resolved: 2022-10-12

## 2022-12-09 ENCOUNTER — OFFICE VISIT (OUTPATIENT)
Dept: FAMILY MEDICINE CLINIC | Facility: CLINIC | Age: 52
End: 2022-12-09

## 2022-12-09 VITALS
RESPIRATION RATE: 18 BRPM | DIASTOLIC BLOOD PRESSURE: 60 MMHG | BODY MASS INDEX: 27.25 KG/M2 | HEIGHT: 64 IN | TEMPERATURE: 97.9 F | SYSTOLIC BLOOD PRESSURE: 110 MMHG | WEIGHT: 159.6 LBS | HEART RATE: 63 BPM | OXYGEN SATURATION: 100 %

## 2022-12-09 DIAGNOSIS — Z13.6 SCREENING FOR CARDIOVASCULAR, RESPIRATORY, AND GENITOURINARY DISEASES: ICD-10-CM

## 2022-12-09 DIAGNOSIS — Z00.00 HEALTHCARE MAINTENANCE: Primary | ICD-10-CM

## 2022-12-09 DIAGNOSIS — Z13.1 SCREENING FOR DIABETES MELLITUS (DM): ICD-10-CM

## 2022-12-09 DIAGNOSIS — B00.2 HERPES GINGIVOSTOMATITIS: ICD-10-CM

## 2022-12-09 DIAGNOSIS — Z12.11 COLON CANCER SCREENING: ICD-10-CM

## 2022-12-09 DIAGNOSIS — R53.83 OTHER FATIGUE: ICD-10-CM

## 2022-12-09 DIAGNOSIS — Z12.31 BREAST CANCER SCREENING BY MAMMOGRAM: ICD-10-CM

## 2022-12-09 DIAGNOSIS — Z13.83 SCREENING FOR CARDIOVASCULAR, RESPIRATORY, AND GENITOURINARY DISEASES: ICD-10-CM

## 2022-12-09 DIAGNOSIS — F33.9 RECURRENT DEPRESSION (HCC): ICD-10-CM

## 2022-12-09 DIAGNOSIS — R06.09 DOE (DYSPNEA ON EXERTION): ICD-10-CM

## 2022-12-09 DIAGNOSIS — G62.9 NEUROPATHY: ICD-10-CM

## 2022-12-09 DIAGNOSIS — Z13.89 SCREENING FOR CARDIOVASCULAR, RESPIRATORY, AND GENITOURINARY DISEASES: ICD-10-CM

## 2022-12-09 DIAGNOSIS — F41.1 GAD (GENERALIZED ANXIETY DISORDER): ICD-10-CM

## 2022-12-09 DIAGNOSIS — H81.10 BPPV (BENIGN PAROXYSMAL POSITIONAL VERTIGO), UNSPECIFIED LATERALITY: ICD-10-CM

## 2022-12-09 RX ORDER — VALACYCLOVIR HYDROCHLORIDE 1 G/1
TABLET, FILM COATED ORAL
Qty: 40 TABLET | Refills: 1 | Status: SHIPPED | OUTPATIENT
Start: 2022-12-09 | End: 2023-12-09

## 2022-12-09 RX ORDER — MECLIZINE HYDROCHLORIDE 25 MG/1
25 TABLET ORAL 3 TIMES DAILY PRN
Qty: 30 TABLET | Refills: 0 | Status: SHIPPED | OUTPATIENT
Start: 2022-12-09

## 2022-12-09 RX ORDER — CRISABOROLE 20 MG/G
OINTMENT TOPICAL
COMMUNITY
Start: 2022-11-18

## 2022-12-09 RX ORDER — GABAPENTIN 100 MG/1
300 CAPSULE ORAL
Qty: 90 CAPSULE | Refills: 1 | Status: SHIPPED | OUTPATIENT
Start: 2022-12-09

## 2022-12-09 RX ORDER — DULOXETIN HYDROCHLORIDE 30 MG/1
30 CAPSULE, DELAYED RELEASE ORAL DAILY
Qty: 90 CAPSULE | Refills: 1 | Status: SHIPPED | OUTPATIENT
Start: 2022-12-09

## 2022-12-09 NOTE — PATIENT INSTRUCTIONS
For mood, we can change the paxil 20mg/d to cymbalta 30mg/d which may help with neuropathy  In the future, an option called gabapentin may be useful and safer than lyrica for neuropathy symptoms  You can start with 100mg one pill at bedtime for one week then increase it to 200mg the second week then 300mg after that

## 2022-12-09 NOTE — PROGRESS NOTES
Assessment/Plan:    No problem-specific Assessment & Plan notes found for this encounter  cpe    MKIE with neuropathy  She has been getting paxil and klonopin from doctor in [address] not taking klonopin currently  That doctor also started her on lyrica for neuropathy  Has not seen an neurologist  Advised of lyrica and bzd being controlled substances that I do not recommend  Willing to change paxil to cymbalta to see if helps both  Also start gabapentin 100mg qd x 7 d then 200mg/d x 7d then 300mg/d thereafter  F/u 1m    hsv labialis stable    Bppv, refuses balance center referral    Cardio eval for vague PROCTOR    F/u derm for Eurcrisa    For mood, we can change the paxil 20mg/d to cymbalta 30mg/d which may help with neuropathy  In the future, an option called gabapentin may be useful and safer than lyrica for neuropathy symptoms  You can start with 100mg one pill at bedtime for one week then increase it to 200mg the second week then 300mg after that  Diagnoses and all orders for this visit:    Healthcare maintenance    Colon cancer screening  -     Ambulatory referral for colonoscopy; Future    Breast cancer screening by mammogram  -     Mammo screening bilateral w 3d & cad; Future    MIKE (generalized anxiety disorder)  -     DULoxetine (Cymbalta) 30 mg delayed release capsule; Take 1 capsule (30 mg total) by mouth daily    Herpes gingivostomatitis  -     valACYclovir (VALTREX) 1,000 mg tablet; Take 2 grams at earliest onset of cold sore then repeat once more in 12 hours    BPPV (benign paroxysmal positional vertigo), unspecified laterality  -     meclizine (ANTIVERT) 25 mg tablet; Take 1 tablet (25 mg total) by mouth 3 (three) times a day as needed for dizziness  -     Ambulatory referral to Physical Therapy; Future    Screening for cardiovascular, respiratory, and genitourinary diseases  -     Lipid Panel with Direct LDL reflex;  Future    Screening for diabetes mellitus (DM)  -     Comprehensive metabolic panel; Future    Other fatigue  -     TSH, 3rd generation; Future  -     T4, free; Future  -     Vitamin D 25 hydroxy; Future    Neuropathy  -     gabapentin (Neurontin) 100 mg capsule; Take 3 capsules (300 mg total) by mouth daily at bedtime    Recurrent depression ([facility] [address] )    PROCTOR (dyspnea on exertion)  -     Ambulatory referral to Cardiology; Future    Other orders  -     Eucrisa 2 % OINT          Return in about 1 month (around 1/9/2023) for Recheck  Subjective:      Patient ID: Xiomara Ramon is a 45 y o  female  Chief Complaint   Patient presents with   • Physical Exam     Physical exam  Sophie Calderon       HPI    cpe  Eating healthy sometimes  Likes sweets  Some exercise 2x/w casual    Father MI 76s  M cva    On vit D 4K/d    Cold sores  infreq though  Valtrex helps    paxil started in [place] 72 for neuropathy per pt    The following portions of the patient's history were reviewed and updated as appropriate: allergies, current medications, past family history, past medical history, past social history, past surgical history and problem list     Review of Systems   Constitutional: Negative for chills and fever           Current Outpatient Medications   Medication Sig Dispense Refill   • ascorbic acid (VITAMIN C) 250 MG tablet Take 250 mg by mouth daily     • Calcium 500-100 MG-UNIT CHEW Calcium 500     • Cholecalciferol (VITAMIN D3) 1000 UNIT/SPRAY LIQD Vitamin D3     • DULoxetine (Cymbalta) 30 mg delayed release capsule Take 1 capsule (30 mg total) by mouth daily 90 capsule 1   • Eucrisa 2 % OINT      • gabapentin (Neurontin) 100 mg capsule Take 3 capsules (300 mg total) by mouth daily at bedtime 90 capsule 1   • meclizine (ANTIVERT) 25 mg tablet Take 1 tablet (25 mg total) by mouth 3 (three) times a day as needed for dizziness 30 tablet 0   • valACYclovir (VALTREX) 1,000 mg tablet Take 2 grams at earliest onset of cold sore then repeat once more in 12 hours 40 tablet 1     No current facility-administered medications for this visit  Objective:    /60   Pulse 63   Temp 97 9 °F (36 6 °C)   Resp 18   Ht 5' 4" (1 626 m)   Wt 72 4 kg (159 lb 9 6 oz)   SpO2 100%   BMI 27 40 kg/m²        Physical Exam  Vitals and nursing note reviewed  Constitutional:       General: She is not in acute distress  Appearance: She is well-developed  She is not ill-appearing  HENT:      Head: Normocephalic  Right Ear: Tympanic membrane normal       Left Ear: Tympanic membrane normal       Nose: No congestion  Eyes:      General: No scleral icterus  Conjunctiva/sclera: Conjunctivae normal    Neck:      Vascular: No carotid bruit  Cardiovascular:      Rate and Rhythm: Normal rate and regular rhythm  Heart sounds: No murmur heard  Pulmonary:      Effort: Pulmonary effort is normal  No respiratory distress  Breath sounds: No wheezing  Abdominal:      General: There is no distension  Palpations: Abdomen is soft  Tenderness: There is no abdominal tenderness  Musculoskeletal:         General: No deformity  Cervical back: Neck supple  Right lower leg: No edema  Left lower leg: No edema  Skin:     General: Skin is warm and dry  Coloration: Skin is not pale  Neurological:      Mental Status: She is alert  Motor: No weakness  Gait: Gait normal    Psychiatric:         Mood and Affect: Mood is anxious  Behavior: Behavior normal          Thought Content:  Thought content normal                 Rambo Painting DO

## 2023-01-06 ENCOUNTER — CONSULT (OUTPATIENT)
Dept: CARDIOLOGY CLINIC | Facility: CLINIC | Age: 53
End: 2023-01-06

## 2023-01-06 VITALS
TEMPERATURE: 97.8 F | BODY MASS INDEX: 28.51 KG/M2 | WEIGHT: 167 LBS | SYSTOLIC BLOOD PRESSURE: 134 MMHG | OXYGEN SATURATION: 99 % | HEIGHT: 64 IN | DIASTOLIC BLOOD PRESSURE: 70 MMHG | HEART RATE: 62 BPM

## 2023-01-06 DIAGNOSIS — R07.2 PRECORDIAL PAIN: ICD-10-CM

## 2023-01-06 DIAGNOSIS — R06.09 DOE (DYSPNEA ON EXERTION): Primary | ICD-10-CM

## 2023-01-06 NOTE — PROGRESS NOTES
Consultation - Cardiology Office  [facility]  Lucy Perez 45 y o  female MRN: [id number]  : 1970  Unit/Bed#:  Encounter: [id number]      ASSESSMENT:  Dyspnea and chest pressure on exertion    Neuropathy  History of HSV labialis/gingivostomatitis, stable  BPPV, refuses balance center referral    Family history of coronary artery disease  Father  of MI in his 76s    RECOMMENDATIONS:  Echocardiogram  Exercise stress test  Low-cholesterol diet  Regular cardiovascular exercise      Thank you for your consultation  If you have any question please call me at [phone]      Primary Care Physician Requesting Consult: Eamon Armstrong DO      Reason for Consult / Principal Problem: Chest patient and dyspnea on        HPI :     Lucy Perez is a 45y o  year old female who was referred by primary care doctor for evaluation of chest pressure and dyspnea on exertion  She is also has a family history of coronary artery disease  Her father  of an MI in his 76s  Her LDL is mildly elevated  I advised her on low-cholesterol diet and regular exercise  Review of Systems   Respiratory: Positive for chest tightness and shortness of breath (Dyspnea on exertion)  All other systems reviewed and are negative  Historical Information   Past Medical History:   Diagnosis Date   • Anxiety    • Disease of thyroid gland      No past surgical history on file    Social History     Substance and Sexual Activity   Alcohol Use Never     Social History     Substance and Sexual Activity   Drug Use Never     Social History     Tobacco Use   Smoking Status Never   Smokeless Tobacco Never     Family History:   Family History   Problem Relation Age of Onset   • Arthritis Family        Meds/Allergies     Allergies   Allergen Reactions   • Cefuroxime Rash   • Penicillins        Current Outpatient Medications:   •  ascorbic acid (VITAMIN C) 250 MG tablet, Take 250 mg by mouth daily, Disp: , Rfl:   •  Calcium 500-100 MG-UNIT CHEW, Calcium 500, Disp: , Rfl:   •  Cholecalciferol (VITAMIN D3) 1000 UNIT/SPRAY LIQD, Vitamin D3, Disp: , Rfl:   •  DULoxetine (Cymbalta) 30 mg delayed release capsule, Take 1 capsule (30 mg total) by mouth daily, Disp: 90 capsule, Rfl: 1  •  Eucrisa 2 % OINT, , Disp: , Rfl:   •  gabapentin (Neurontin) 100 mg capsule, Take 3 capsules (300 mg total) by mouth daily at bedtime, Disp: 90 capsule, Rfl: 1  •  meclizine (ANTIVERT) 25 mg tablet, Take 1 tablet (25 mg total) by mouth 3 (three) times a day as needed for dizziness, Disp: 30 tablet, Rfl: 0  •  valACYclovir (VALTREX) 1,000 mg tablet, Take 2 grams at earliest onset of cold sore then repeat once more in 12 hours, Disp: 40 tablet, Rfl: 1    Vitals: Blood pressure 134/70, pulse 62, temperature 97 8 °F (36 6 °C), height 5' 4" (1 626 m), weight 75 8 kg (167 lb), SpO2 99 %, not currently breastfeeding  ?  Body mass index is 28 67 kg/m²  Vitals:    01/06/23 1335   Weight: 75 8 kg (167 lb)     BP Readings from Last 3 Encounters:   01/06/23 134/70   12/09/22 110/60   03/23/22 120/60       PHYSICAL EXAMINATION:  Neurologic:  Alert & oriented x 3, no new focal deficits, Not in any acute distress,  Constitutional:  Well developed, well nourished, non-toxic appearance   Eyes:  Pupil equal and reacting to light, conjunctiva normal, No JVP, No LNP   HENT:  Atraumatic, oropharynx moist, Neck- normal range of motion, no tenderness,  Neck supple   Respiratory:  Bilateral air entry, mostly clear to auscultation  Cardiovascular: S1-S2 regular with a I/VI systolic murmur   GI:  Soft, nondistended, normal bowel sounds, nontender, no hepatosplenomegaly appreciated  Musculoskeletal: no tenderness, no deformities     Skin:  Well hydrated, no rash   Lymphatic:  No lymphadenopathy noted   Extremities:  No edema and distal pulses are present    Diagnostic Studies Review Cardio:      EKG: Normal sinus rhythm with sinus arrhythmia, heart rate 62/min    Cardiac testing:   No results found for this or any previous visit  Imaging:  Chest X-Ray:   No Chest XR results available for this patient  CT-scan of the chest:     No CTA results available for this patient  Lab Review   Lab Results   Component Value Date    WBC 4 33 01/22/2021    HGB 12 5 01/22/2021    HCT 38 1 01/22/2021    MCV 97 01/22/2021    RDW 12 5 01/22/2021     01/22/2021     BMP:  Lab Results   Component Value Date    SODIUM 141 04/09/2022    K 4 4 04/09/2022     04/09/2022    CO2 31 04/09/2022    BUN 18 04/09/2022    CREATININE 0 69 04/09/2022    GLUC 102 (H) 04/09/2022    CALCIUM 9 0 04/09/2022    CORRECTEDCA 8 8 01/22/2021    EGFR 95 01/22/2021     LFT:  Lab Results   Component Value Date    AST 16 04/09/2022    ALT 10 04/09/2022    ALKPHOS 57 04/09/2022    TP 6 5 04/09/2022    ALB 3 8 04/09/2022      No results found for: [id number]  No components found for: TSH3  No results found for: HGBA1C  Lipid Profile:   Lab Results   Component Value Date    CHOLESTEROL 209 (H) 04/09/2022    HDL 67 04/09/2022    LDLCALC 129 (H) 04/09/2022    TRIG 44 04/09/2022     Lab Results   Component Value Date    CHOLESTEROL 209 (H) 04/09/2022    CHOLESTEROL 222 (H) 06/20/2018     Lab Results   Component Value Date    TROPONINI <0 02 01/22/2021     No results found for: NTBNP   No results found for this or any previous visit (from the past 672 hour(s))  Dr Deborah Cobb MD, [facility]      "This note has been constructed using a voice recognition system  Therefore there may be syntax, spelling, and/or grammatical errors   Please call if you have any questions  "

## 2023-01-11 ENCOUNTER — TELEPHONE (OUTPATIENT)
Dept: CARDIOLOGY CLINIC | Facility: CLINIC | Age: 53
End: 2023-01-11

## 2023-01-11 NOTE — TELEPHONE ENCOUNTER
P/C stating her daughter is a cardiologist she stated she needs to complete a coronary CTA, not a stress and echo  I stated for insurance reasons she needs to start with the basic testing or the CTA will not be approved, but she asked me to check with you anyway   Please advise

## 2023-01-15 PROBLEM — E05.90 SUBCLINICAL HYPERTHYROIDISM: Status: ACTIVE | Noted: 2021-08-17

## 2023-01-15 LAB
25(OH)D3 SERPL-MCNC: 67 NG/ML (ref 30–100)
ALBUMIN SERPL-MCNC: 4.1 G/DL (ref 3.6–5.1)
ALBUMIN/GLOB SERPL: 1.6 (CALC) (ref 1–2.5)
ALP SERPL-CCNC: 63 U/L (ref 37–153)
ALT SERPL-CCNC: 9 U/L (ref 6–29)
AST SERPL-CCNC: 13 U/L (ref 10–35)
BILIRUB SERPL-MCNC: 0.6 MG/DL (ref 0.2–1.2)
BUN SERPL-MCNC: 18 MG/DL (ref 7–25)
BUN/CREAT SERPL: NORMAL (CALC) (ref 6–22)
CALCIUM SERPL-MCNC: 9.3 MG/DL (ref 8.6–10.4)
CHLORIDE SERPL-SCNC: 105 MMOL/L (ref 98–110)
CHOLEST SERPL-MCNC: 274 MG/DL
CHOLEST/HDLC SERPL: 4 (CALC)
CO2 SERPL-SCNC: 31 MMOL/L (ref 20–32)
CREAT SERPL-MCNC: 0.69 MG/DL (ref 0.5–1.03)
GFR/BSA.PRED SERPLBLD CYS-BASED-ARV: 103 ML/MIN/1.73M2
GLOBULIN SER CALC-MCNC: 2.6 G/DL (CALC) (ref 1.9–3.7)
GLUCOSE SERPL-MCNC: 95 MG/DL (ref 65–99)
HDLC SERPL-MCNC: 69 MG/DL
LDLC SERPL CALC-MCNC: 193 MG/DL (CALC)
NONHDLC SERPL-MCNC: 205 MG/DL (CALC)
POTASSIUM SERPL-SCNC: 4.3 MMOL/L (ref 3.5–5.3)
PROT SERPL-MCNC: 6.7 G/DL (ref 6.1–8.1)
SODIUM SERPL-SCNC: 140 MMOL/L (ref 135–146)
T4 FREE SERPL-MCNC: 1.1 NG/DL (ref 0.8–1.8)
TRIGL SERPL-MCNC: 39 MG/DL
TSH SERPL-ACNC: 0.35 MIU/L

## 2023-01-24 ENCOUNTER — HOSPITAL ENCOUNTER (OUTPATIENT)
Dept: NON INVASIVE DIAGNOSTICS | Facility: HOSPITAL | Age: 53
Discharge: HOME | End: 2023-01-24
Attending: INTERNAL MEDICINE

## 2023-01-24 VITALS
BODY MASS INDEX: 28.51 KG/M2 | HEIGHT: 64 IN | DIASTOLIC BLOOD PRESSURE: 60 MMHG | WEIGHT: 167 LBS | HEART RATE: 56 BPM | SYSTOLIC BLOOD PRESSURE: 110 MMHG

## 2023-01-24 VITALS
WEIGHT: 167 LBS | HEIGHT: 64 IN | HEART RATE: 100 BPM | BODY MASS INDEX: 28.51 KG/M2 | SYSTOLIC BLOOD PRESSURE: 118 MMHG | DIASTOLIC BLOOD PRESSURE: 75 MMHG | OXYGEN SATURATION: 99 %

## 2023-01-24 DIAGNOSIS — R07.2 PRECORDIAL PAIN: ICD-10-CM

## 2023-01-24 DIAGNOSIS — R06.09 DOE (DYSPNEA ON EXERTION): ICD-10-CM

## 2023-01-24 LAB
AORTIC ROOT: 2.7 CM
APICAL FOUR CHAMBER EJECTION FRACTION: 58 %
AV LVOT PEAK GRADIENT: 4 MMHG
AV PEAK GRADIENT: 7 MMHG
CHEST PAIN STATEMENT: NORMAL
DOP CALC LVOT AREA: 3.8 CM2
DOP CALC LVOT DIAMETER: 2.2 CM
E WAVE DECELERATION TIME: 256 MS
FRACTIONAL SHORTENING: 31 (ref 28–44)
INTERVENTRICULAR SEPTUM IN DIASTOLE (PARASTERNAL SHORT AXIS VIEW): 0.7 CM
INTERVENTRICULAR SEPTUM: 0.7 CM (ref 0.6–1.1)
LAAS-AP2: 18.7 CM2
LAAS-AP4: 18.4 CM2
LEFT ATRIUM SIZE: 3.4 CM
LEFT INTERNAL DIMENSION IN SYSTOLE: 3.5 CM (ref 2.1–4)
LEFT VENTRICULAR INTERNAL DIMENSION IN DIASTOLE: 5.1 CM (ref 3.5–6)
LEFT VENTRICULAR POSTERIOR WALL IN END DIASTOLE: 0.9 CM
LEFT VENTRICULAR STROKE VOLUME: 71 ML
LVSV (TEICH): 71 ML
MAX DIASTOLIC BP: 82 MMHG
MAX HEART RATE: 171 BPM
MAX PREDICTED HEART RATE: 166 BPM
MAX. SYSTOLIC BP: 178 MMHG
MV E'TISSUE VEL-SEP: 7 CM/S
MV PEAK A VEL: 0.74 M/S
MV PEAK E VEL: 89 CM/S
MV STENOSIS PRESSURE HALF TIME: 74 MS
MV VALVE AREA P 1/2 METHOD: 2.97
PROTOCOL NAME: NORMAL
RA PRESSURE ESTIMATED: 8 MMHG
REASON FOR TERMINATION: NORMAL
RIGHT ATRIUM AREA SYSTOLE A4C: 13.8 CM2
RIGHT VENTRICLE ID DIMENSION: 2.9 CM
RV PSP: 27 MMHG
SL CV LEFT ATRIUM LENGTH A2C: 5.2 CM
SL CV LV EF: 58
SL CV PED ECHO LEFT VENTRICLE DIASTOLIC VOLUME (MOD BIPLANE) 2D: 123 ML
SL CV PED ECHO LEFT VENTRICLE SYSTOLIC VOLUME (MOD BIPLANE) 2D: 52 ML
TARGET HR FORMULA: NORMAL
TEST INDICATION: NORMAL
TIME IN EXERCISE PHASE: NORMAL
TR MAX PG: 19 MMHG
TR PEAK VELOCITY: 2.2 M/S
TRICUSPID ANNULAR PLANE SYSTOLIC EXCURSION: 2.6 CM
TRICUSPID VALVE PEAK REGURGITATION VELOCITY: 2.15 M/S

## 2023-01-25 ENCOUNTER — TELEPHONE (OUTPATIENT)
Dept: CARDIOLOGY CLINIC | Facility: CLINIC | Age: 53
End: 2023-01-25

## 2023-01-25 LAB
MAX HR PERCENT: 103 %
MAX HR: 171 BPM
RATE PRESSURE PRODUCT: NORMAL
SL CV STRESS RECOVERY BP: NORMAL MMHG
SL CV STRESS RECOVERY HR: 84 BPM
SL CV STRESS RECOVERY O2 SAT: 98 %
SL CV STRESS STAGE REACHED: 4
STRESS ANGINA INDEX: 0
STRESS BASELINE BP: NORMAL MMHG
STRESS BASELINE HR: 100 BPM
STRESS O2 SAT REST: 99 %
STRESS PEAK HR: 171 BPM
STRESS POST ESTIMATED WORKLOAD: 13.4 METS
STRESS POST EXERCISE DUR MIN: 10 MIN
STRESS POST EXERCISE DUR SEC: 44 SEC
STRESS POST O2 SAT PEAK: 99 %
STRESS POST PEAK BP: 178 MMHG

## 2023-01-25 NOTE — TELEPHONE ENCOUNTER
----- Message from Linwood Espinal MD sent at 1/25/2023 12:29 PM EST -----  Pt's Patient's stress test is normal    Patient can keep regular appointment  Please call patient with the result

## 2023-01-25 NOTE — TELEPHONE ENCOUNTER
----- Message from Dhiraj Carroll MD sent at 1/25/2023  3:03 AM EST -----  Please call and inform the patient that the Echocardiogram showed normal pumping function of the heart  No significant valve abnormality was seen

## 2023-03-20 ENCOUNTER — OFFICE VISIT (OUTPATIENT)
Dept: URGENT CARE | Facility: CLINIC | Age: 53
End: 2023-03-20

## 2023-03-20 ENCOUNTER — TELEPHONE (OUTPATIENT)
Dept: FAMILY MEDICINE CLINIC | Facility: CLINIC | Age: 53
End: 2023-03-20

## 2023-03-20 VITALS
RESPIRATION RATE: 18 BRPM | HEIGHT: 64 IN | OXYGEN SATURATION: 100 % | HEART RATE: 70 BPM | BODY MASS INDEX: 27.31 KG/M2 | SYSTOLIC BLOOD PRESSURE: 129 MMHG | WEIGHT: 160 LBS | TEMPERATURE: 97.3 F | DIASTOLIC BLOOD PRESSURE: 72 MMHG

## 2023-03-20 DIAGNOSIS — R21 RASH: Primary | ICD-10-CM

## 2023-03-20 DIAGNOSIS — R79.89 LOW TSH LEVEL: Primary | ICD-10-CM

## 2023-03-20 RX ORDER — PREGABALIN 75 MG/1
75 CAPSULE ORAL 3 TIMES DAILY
COMMUNITY

## 2023-03-20 RX ORDER — HYDROXYZINE HYDROCHLORIDE 25 MG/1
25 TABLET, FILM COATED ORAL EVERY 6 HOURS PRN
Qty: 14 TABLET | Refills: 0 | Status: SHIPPED | OUTPATIENT
Start: 2023-03-20

## 2023-03-20 RX ORDER — METHYLPREDNISOLONE SODIUM SUCCINATE 40 MG/ML
40 INJECTION, POWDER, LYOPHILIZED, FOR SOLUTION INTRAMUSCULAR; INTRAVENOUS ONCE
Status: COMPLETED | OUTPATIENT
Start: 2023-03-20 | End: 2023-03-20

## 2023-03-20 RX ADMIN — METHYLPREDNISOLONE SODIUM SUCCINATE 40 MG: 40 INJECTION, POWDER, LYOPHILIZED, FOR SOLUTION INTRAMUSCULAR; INTRAVENOUS at 10:11

## 2023-03-20 NOTE — PROGRESS NOTES
[facility]        NAME: Phuong Green is a 45 y o  female  : 1970    MRN: [id number]  DATE: 2023  TIME: 10:22 AM    Assessment and Plan   Rash [R21]  1  Rash  methylPREDNISolone sodium succinate (Solu-MEDROL) injection 40 mg    hydrOXYzine HCL (ATARAX) 25 mg tablet        Will send atarax and advised to d/c benadryl  Follow up with derm tomorrow  Discussed with patient that this rash does bear some resemblance to a hyperthyroidism-induced rash and I advised she get Grave's antibodies drawn  She states she will call her pcp  Discussed strict return to care precautions as well as red flag symptoms which should prompt immediate ED referral  Pt verbalized understanding and is in agreement with plan  Please follow up with your primary care provider within the next week  Please remember that your visit today was with an urgent care provider and should not replace follow up with your primary care provider for chronic medical issues or annual physicals  Patient Instructions       Follow up with PCP in 3-5 days  Proceed to  ER if symptoms worsen  Chief Complaint     Chief Complaint   Patient presents with   • Rash     Rash on face began last week became intolerable after eating spicy food  Has eczema on bilateral hands with swelling of rt hand  Has a burning sensation within her body         History of Present Illness       Pt is a 44 yo female with pmh anxiety, thyroid disease (?) pw rash x 1 week  Itching, burning  Started on forehead and then spread to the rest of her body - axillae, left hip, b/l forearms and upper arms  Spares palms and soles  No new detergents/soaps/meds  No known bug bites or poisonous plant exposure  States she has something similar once before, got allergy shots, and it went away  Took 50 mg benadryl on two separate occasions which helped a little  Did have chicken pox as a child  Has appointment with dermatology tomorrow   Of note, she is not on any thyroid medications but for last several years has had low TSH with normal or near-normal T3/T4  States she has never been officially diagnosed with a thyroid disorder  Review of Systems   Review of Systems   Constitutional: Negative for chills, diaphoresis and fever  HENT: Negative for congestion, rhinorrhea and sore throat  Eyes: Negative for discharge and itching  Respiratory: Negative for cough, chest tightness, shortness of breath and wheezing  Cardiovascular: Negative for chest pain  Gastrointestinal: Negative for diarrhea, nausea and vomiting  Musculoskeletal: Negative for myalgias  Skin: Positive for rash  Neurological: Negative for weakness and numbness           Current Medications       Current Outpatient Medications:   •  ascorbic acid (VITAMIN C) 250 MG tablet, Take 250 mg by mouth daily, Disp: , Rfl:   •  Calcium 500-100 MG-UNIT CHEW, Calcium 500, Disp: , Rfl:   •  Cholecalciferol (VITAMIN D3) 1000 UNIT/SPRAY LIQD, Vitamin D3, Disp: , Rfl:   •  Eucrisa 2 % OINT, , Disp: , Rfl:   •  hydrOXYzine HCL (ATARAX) 25 mg tablet, Take 1 tablet (25 mg total) by mouth every 6 (six) hours as needed for itching, Disp: 14 tablet, Rfl: 0  •  pregabalin (LYRICA) 75 mg capsule, Take 75 mg by mouth 3 (three) times a day, Disp: , Rfl:   •  DULoxetine (Cymbalta) 30 mg delayed release capsule, Take 1 capsule (30 mg total) by mouth daily (Patient not taking: Reported on 3/20/2023), Disp: 90 capsule, Rfl: 1  •  gabapentin (Neurontin) 100 mg capsule, Take 3 capsules (300 mg total) by mouth daily at bedtime (Patient not taking: Reported on 3/20/2023), Disp: 90 capsule, Rfl: 1  •  meclizine (ANTIVERT) 25 mg tablet, Take 1 tablet (25 mg total) by mouth 3 (three) times a day as needed for dizziness (Patient not taking: Reported on 3/20/2023), Disp: 30 tablet, Rfl: 0  •  valACYclovir (VALTREX) 1,000 mg tablet, Take 2 grams at earliest onset of cold sore then repeat once more in 12 hours (Patient not taking: Reported on 3/20/2023), Disp: 40 tablet, Rfl: 1  No current facility-administered medications for this visit  Current Allergies     Allergies as of 03/20/2023 - Reviewed 03/20/2023   Allergen Reaction Noted   • Cefuroxime Rash 03/19/2022   • Penicillins  09/07/2019            The following portions of the patient's history were reviewed and updated as appropriate: allergies, current medications, past family history, past medical history, past social history, past surgical history and problem list      Past Medical History:   Diagnosis Date   • Anxiety    • Disease of thyroid gland        No past surgical history on file  Family History   Problem Relation Age of Onset   • Arthritis Family          Medications have been verified  Objective   /72   Pulse 70   Temp (!) 97 3 °F (36 3 °C)   Resp 18   Ht 5' 4" (1 626 m)   Wt 72 6 kg (160 lb)   SpO2 100%   BMI 27 46 kg/m²        Physical Exam     Physical Exam  Vitals and nursing note reviewed  Constitutional:       General: She is not in acute distress  Appearance: Normal appearance  She is not ill-appearing  HENT:      Head: Normocephalic and atraumatic  Cardiovascular:      Rate and Rhythm: Normal rate  Pulmonary:      Effort: Pulmonary effort is normal  No respiratory distress  Skin:     General: Skin is warm and dry  Capillary Refill: Capillary refill takes less than 2 seconds  Findings: Rash (Large welts/hives present all over face  Rash elsewhere as described in HPI appears as pinpoint maculopapules and some excoriations ) present  Neurological:      Mental Status: She is alert and oriented to person, place, and time     Psychiatric:         Behavior: Behavior normal

## 2023-03-20 NOTE — TELEPHONE ENCOUNTER
Please order  Once ordered I will inform patient to check with insurance to make sure its covered    Lorella Pack

## 2023-04-22 ENCOUNTER — HOSPITAL ENCOUNTER (EMERGENCY)
Facility: HOSPITAL | Age: 53
Discharge: HOME | End: 2023-04-22
Attending: EMERGENCY MEDICINE | Admitting: EMERGENCY MEDICINE

## 2023-04-22 VITALS
RESPIRATION RATE: 18 BRPM | SYSTOLIC BLOOD PRESSURE: 124 MMHG | TEMPERATURE: 98.1 F | OXYGEN SATURATION: 100 % | DIASTOLIC BLOOD PRESSURE: 69 MMHG | HEART RATE: 71 BPM

## 2023-04-22 DIAGNOSIS — R21 FACIAL RASH: Primary | ICD-10-CM

## 2023-04-22 LAB — GLUCOSE SERPL-MCNC: 91 MG/DL (ref 65–140)

## 2023-04-22 NOTE — DISCHARGE INSTRUCTIONS
Diagnosis; facial rash     - please do not scratch do not want a secondary infection     - for itching wound start with over the counter generic citirizine starting at 10 mg 2-3 times a day which is an increased dose- this can cause dry eyes/ mouth -- will need to keep lips/ hydrated  and face hydrated with moisturizers- like eucerin/ cetaphil/ aveno etc    - can take xanax one hour before bed    - would contact the dermatologist about a possible  biopsy of rash- would also contact your primary doctor about repeat cortisol  level about 1 month after you stooped the prednisone    - please return to  the er or see your primary doctor  for any fevers- temp > 100 4/ any oral/vaginal ulcers- any skin peeling any joint redness/swelling- or any new/ worsening/concerning symptoms to you

## 2023-04-25 NOTE — ED PROVIDER NOTES
History  Chief Complaint   Patient presents with   • Rash     Pt reports rash on face x1 month, was on prednisone for 10 days from dermatologist, after easter rash got worse, was at dermatologist again and dx with fungal infection, c/o burning     45 yr female with approx 1 month of facial rash-- seen  By dermatology x 2-- placed on steroids- improved-  Finished steroids- now  Facial rash worse- - puritic- no rash anywhere else on body at present- but in past was on hip area-- no fevers- no skin peeling  -   To get phototherapy -- went to holistic medicine  And had multiple labs done-- told cortisol was high- but pt recently finished 10 days of pred--  No fevers- no bone/jt muscle pain       History provided by:  Patient and spouse   used: No        Prior to Admission Medications   Prescriptions Last Dose Informant Patient Reported? Taking?    Calcium 500-100 MG-UNIT CHEW   Yes No   Sig: Calcium 500   Cholecalciferol (VITAMIN D3) 1000 UNIT/SPRAY LIQD   Yes No   Sig: Vitamin D3   DULoxetine (Cymbalta) 30 mg delayed release capsule   No No   Sig: Take 1 capsule (30 mg total) by mouth daily   Patient not taking: Reported on 3/20/2023   Eucrisa 2 % OINT   Yes No   ascorbic acid (VITAMIN C) 250 MG tablet   Yes No   Sig: Take 250 mg by mouth daily   gabapentin (Neurontin) 100 mg capsule   No No   Sig: Take 3 capsules (300 mg total) by mouth daily at bedtime   Patient not taking: Reported on 3/20/2023   hydrOXYzine HCL (ATARAX) 25 mg tablet   No No   Sig: Take 1 tablet (25 mg total) by mouth every 6 (six) hours as needed for itching   meclizine (ANTIVERT) 25 mg tablet   No No   Sig: Take 1 tablet (25 mg total) by mouth 3 (three) times a day as needed for dizziness   Patient not taking: Reported on 3/20/2023   pregabalin (LYRICA) 75 mg capsule   Yes No   Sig: Take 75 mg by mouth 3 (three) times a day   valACYclovir (VALTREX) 1,000 mg tablet   No No   Sig: Take 2 grams at earliest onset of cold sore then repeat once more in 12 hours   Patient not taking: Reported on 3/20/2023      Facility-Administered Medications: None       Past Medical History:   Diagnosis Date   • Anxiety    • Disease of thyroid gland        History reviewed  No pertinent surgical history  Family History   Problem Relation Age of Onset   • Arthritis Family      I have reviewed and agree with the history as documented  E-Cigarette/Vaping   • E-Cigarette Use Never User      E-Cigarette/Vaping Substances   • Nicotine No    • THC No    • CBD No    • Flavoring No    • Other No    • Unknown No      Social History     Tobacco Use   • Smoking status: Never   • Smokeless tobacco: Never   Vaping Use   • Vaping Use: Never used   Substance Use Topics   • Alcohol use: Never   • Drug use: Never       Review of Systems   Constitutional: Negative  HENT: Negative  Eyes: Negative  Respiratory: Negative  Cardiovascular: Negative  Gastrointestinal: Negative  Endocrine: Negative  Genitourinary: Negative  Musculoskeletal: Negative  Skin: Positive for rash  Negative for color change, pallor and wound  Allergic/Immunologic: Negative  Neurological: Negative  Hematological: Negative  Psychiatric/Behavioral: Negative  Physical Exam  Physical Exam  Vitals and nursing note reviewed  Constitutional:       General: She is not in acute distress  Appearance: Normal appearance  She is not ill-appearing, toxic-appearing or diaphoretic  Comments: avss--  Pulse ox 100 % on ra- intepretation is normal- no intervention    HENT:      Head: Normocephalic and atraumatic  Right Ear: Tympanic membrane, ear canal and external ear normal  There is no impacted cerumen  Left Ear: Tympanic membrane, ear canal and external ear normal  There is no impacted cerumen  Nose: Nose normal  No congestion or rhinorrhea  Mouth/Throat:      Mouth: Mucous membranes are moist       Pharynx: Oropharynx is clear   No oropharyngeal exudate or posterior oropharyngeal erythema  Eyes:      General: No scleral icterus  Right eye: No discharge  Left eye: No discharge  Extraocular Movements: Extraocular movements intact  Conjunctiva/sclera: Conjunctivae normal       Pupils: Pupils are equal, round, and reactive to light  Comments: Mm pink   Neck:      Vascular: No carotid bruit  Comments: No pmt c/tl/s spine  Cardiovascular:      Rate and Rhythm: Normal rate and regular rhythm  Pulses: Normal pulses  Heart sounds: Normal heart sounds  No murmur heard  No friction rub  No gallop  Pulmonary:      Effort: Pulmonary effort is normal  No respiratory distress  Breath sounds: No stridor  No wheezing, rhonchi or rales  Chest:      Chest wall: No tenderness  Abdominal:      General: Bowel sounds are normal  There is no distension  Palpations: Abdomen is soft  There is no mass  Tenderness: There is no abdominal tenderness  There is no right CVA tenderness, left CVA tenderness, guarding or rebound  Hernia: No hernia is present  Comments: Soft nt/nd- no hsm- no cva tenderness- no ascites- no peritoneal signs    Musculoskeletal:         General: No swelling, tenderness, deformity or signs of injury  Normal range of motion  Cervical back: Normal range of motion and neck supple  No rigidity or tenderness  Right lower leg: No edema  Left lower leg: No edema  Comments: Equal bilateral radial/ dp pulses- no ble edema/calf tenderness/asym/ erythema   Lymphadenopathy:      Cervical: No cervical adenopathy  Skin:     General: Skin is dry  Capillary Refill: Capillary refill takes less than 2 seconds  Coloration: Skin is not jaundiced or pale  Findings: Erythema and rash present  No bruising or lesion  Comments: Diffuse facial dry erythematous rash -- not erysipelas   Neurological:      General: No focal deficit present        Mental Status: She is alert and oriented to person, place, and time  Mental status is at baseline  Cranial Nerves: No cranial nerve deficit  Sensory: No sensory deficit  Motor: No weakness        Coordination: Coordination normal       Gait: Gait normal       Comments: Normal non focal neuro exam    Psychiatric:      Comments: Anxious appearing          Vital Signs  ED Triage Vitals [04/22/23 1329]   Temperature Pulse Respirations Blood Pressure SpO2   98 1 °F (36 7 °C) 71 18 124/69 100 %      Temp Source Heart Rate Source Patient Position - Orthostatic VS BP Location FiO2 (%)   Oral Monitor Sitting -- --      Pain Score       --           Vitals:    04/22/23 1329   BP: 124/69   Pulse: 71   Patient Position - Orthostatic VS: Sitting         Visual Acuity      ED Medications  Medications - No data to display    Diagnostic Studies  Results Reviewed     Procedure Component Value Units Date/Time    Fingerstick Glucose (POCT) [[id number]]  (Normal) Collected: 04/22/23 1419    Lab Status: Final result Updated: 04/22/23 1420     POC Glucose 91 mg/dl                  No orders to display              Procedures  Procedures         ED Course  ED Course as of 04/25/23 1057   Sat Apr 22, 2023   1455 - er md n ote- pt came  with extensive labs done s done 3/23/23-- cbc/ cmp/tsh/ heavy metal screen/ua/  kaitlin/ anti ds anti smith antbx- cortisol level-- kaitlin pos- rest neg--  cortisol elevated-- but pt was on 10 days of pred at time-- explained to pt and  that unfortunaytely er md will nto be lisbeth to gibe pt and huabadn answers for 1 month of facial/ and initially body rash -- body rash has resolved- facial rash resolved with 10 days of oral pred-  no other symptoms symptoms to suggest current autoimmune flare- no renal/ msk/ chest/gi/abd comps-- has seen derm x 2-- and holistic medicine-- will attempt to treat symptoms                                             Medical Decision Making  Pt with 1 month of now facial rash with extensive outpt labs-  Pos kaitlin- but rest of sle  Tests neg-- not infectious on exam--   Er md doubt at present any  sjs type rash - or any systemic illness--     Facial rash: acute illness or injury     Details: see above   Amount and/or Complexity of Data Reviewed  Independent Historian: spouse  External Data Reviewed: labs  Details: all outpt testing labs reviewed by er md- michael discussed with pt adn   Labs: ordered  Decision-making details documented in ED Course  Details: reviewed by er md  Discussion of management or test interpretation with external provider(s): Moderate amount fo er md thought complexity  -see chart and above     Risk  OTC drugs  Decision regarding hospitalization  Disposition  Final diagnoses:   Facial rash     Time reflects when diagnosis was documented in both MDM as applicable and the Disposition within this note     Time User Action Codes Description Comment    4/22/2023  2:37 PM Mi Barclay Add [R21] Facial rash       ED Disposition     ED Disposition   Discharge    Condition   Stable    Date/Time   Sat Apr 22, 2023  2:37 PM    Comment   Mary Jo Umanzor discharge to home/self care                 Follow-up Information    None         Discharge Medication List as of 4/22/2023  3:08 PM      CONTINUE these medications which have NOT CHANGED    Details   ascorbic acid (VITAMIN C) 250 MG tablet Take 250 mg by mouth daily, Historical Med      Calcium 500-100 MG-UNIT CHEW Calcium 500, Historical Med      Cholecalciferol (VITAMIN D3) 1000 UNIT/SPRAY LIQD Vitamin D3, Historical Med      DULoxetine (Cymbalta) 30 mg delayed release capsule Take 1 capsule (30 mg total) by mouth daily, Starting Fri 12/9/2022, Normal      Eucrisa 2 % OINT Starting Fri 11/18/2022, Historical Med      gabapentin (Neurontin) 100 mg capsule Take 3 capsules (300 mg total) by mouth daily at bedtime, Starting Fri 12/9/2022, Normal      hydrOXYzine HCL (ATARAX) 25 mg tablet Take 1 tablet (25 mg total) by mouth every 6 (six) hours as needed for itching, Starting Mon 3/20/2023, Normal      meclizine (ANTIVERT) 25 mg tablet Take 1 tablet (25 mg total) by mouth 3 (three) times a day as needed for dizziness, Starting Fri 12/9/2022, Normal      pregabalin (LYRICA) 75 mg capsule Take 75 mg by mouth 3 (three) times a day, Historical Med      valACYclovir (VALTREX) 1,000 mg tablet Take 2 grams at earliest onset of cold sore then repeat once more in 12 hours, Normal             No discharge procedures on file      PDMP Review     None          ED Provider  Electronically Signed by           Abby Walton MD  04/27/23 9630

## 2023-05-18 RX ORDER — PREDNISONE 20 MG/1
TABLET ORAL
COMMUNITY
Start: 2023-03-21 | End: 2023-05-19

## 2023-05-18 RX ORDER — CLONAZEPAM 0.5 MG/1
TABLET ORAL
COMMUNITY

## 2023-05-18 RX ORDER — TRIAMCINOLONE ACETONIDE 1 MG/G
CREAM TOPICAL
COMMUNITY
Start: 2023-03-21

## 2023-05-18 RX ORDER — SULFACETAMIDE SODIUM AND SULFUR 10; 5 MG/G; MG/G
RINSE TOPICAL
COMMUNITY
Start: 2023-04-17 | End: 2023-05-19

## 2023-05-18 RX ORDER — FLUCONAZOLE 200 MG/1
TABLET ORAL
COMMUNITY
Start: 2023-04-15 | End: 2023-05-19

## 2023-05-18 RX ORDER — HYDROCORTISONE 20 MG/1
TABLET ORAL
COMMUNITY
Start: 2023-04-11 | End: 2023-05-19

## 2023-05-19 ENCOUNTER — OFFICE VISIT (OUTPATIENT)
Dept: FAMILY MEDICINE CLINIC | Facility: CLINIC | Age: 53
End: 2023-05-19

## 2023-05-19 VITALS
HEIGHT: 64 IN | SYSTOLIC BLOOD PRESSURE: 120 MMHG | BODY MASS INDEX: 25.81 KG/M2 | HEART RATE: 72 BPM | WEIGHT: 151.2 LBS | DIASTOLIC BLOOD PRESSURE: 70 MMHG | RESPIRATION RATE: 16 BRPM | TEMPERATURE: 97.8 F

## 2023-05-19 DIAGNOSIS — Z13.820 SCREENING FOR OSTEOPOROSIS: ICD-10-CM

## 2023-05-19 DIAGNOSIS — F33.9 RECURRENT DEPRESSION (HCC): ICD-10-CM

## 2023-05-19 DIAGNOSIS — F41.1 GAD (GENERALIZED ANXIETY DISORDER): ICD-10-CM

## 2023-05-19 DIAGNOSIS — R21 RASH AND NONSPECIFIC SKIN ERUPTION: Primary | ICD-10-CM

## 2023-05-19 DIAGNOSIS — Z12.11 COLON CANCER SCREENING: ICD-10-CM

## 2023-05-19 DIAGNOSIS — B00.2 HERPES GINGIVOSTOMATITIS: ICD-10-CM

## 2023-05-19 DIAGNOSIS — K13.0 ANGULAR CHEILITIS: ICD-10-CM

## 2023-05-19 RX ORDER — CLOTRIMAZOLE AND BETAMETHASONE DIPROPIONATE 10; .64 MG/G; MG/G
CREAM TOPICAL 2 TIMES DAILY
Qty: 45 G | Refills: 0 | Status: SHIPPED | OUTPATIENT
Start: 2023-05-19

## 2023-05-19 RX ORDER — PREGABALIN 50 MG/1
CAPSULE ORAL
COMMUNITY
Start: 2023-05-04

## 2023-05-19 RX ORDER — PIMECROLIMUS 10 MG/G
CREAM TOPICAL
COMMUNITY
Start: 2023-04-28

## 2023-05-19 RX ORDER — MOMETASONE FUROATE 1 MG/G
OINTMENT TOPICAL
COMMUNITY
Start: 2023-05-16

## 2023-05-19 RX ORDER — VALACYCLOVIR HYDROCHLORIDE 1 G/1
TABLET, FILM COATED ORAL
Qty: 40 TABLET | Refills: 1 | Status: SHIPPED | OUTPATIENT
Start: 2023-05-19 | End: 2024-05-18

## 2023-05-19 NOTE — PROGRESS NOTES
Assessment/Plan:    No problem-specific Assessment & Plan notes found for this encounter  Rash  Advised limits of RAST testing    MIKE/depression, f/u psychiatry  Wean lyrica as not indicated    hsv labialis  Valtrex prn  Can try some lysine for prevention    Angular cheilitis new  Use lotrisone prn, not long term     Diagnoses and all orders for this visit:    Rash and nonspecific skin eruption  -     Food Allergy Profile; Future  -     Celiac Disease Comprehensive Panel; Future    Colon cancer screening  -     Ambulatory referral for colonoscopy; Future    Screening for osteoporosis  -     DXA bone density spine hip and pelvis; Future    MIKE (generalized anxiety disorder)    Recurrent depression (HCC)    Angular cheilitis  -     clotrimazole-betamethasone (LOTRISONE) 1-0 05 % cream; Apply topically 2 (two) times a day Short term, sparingly to area: short term, lip    Herpes gingivostomatitis  -     valACYclovir (VALTREX) 1,000 mg tablet;  Take 2 grams at earliest onset of cold sore then repeat once more in 12 hours    Other orders  -     Discontinue: fluconazole (DIFLUCAN) 200 mg tablet; TAKE 1 TABLET EVERY WEEK BY ORAL ROUTE WITH MEALS FOR 28 DAYS  (Patient not taking: Reported on 5/19/2023)  -     hydrocortisone 2 5 % ointment; APPLY TO FACE TWICE DAILY AS DIRECTED FOR 2 WEEKS  -     Discontinue: hydrocortisone (CORTEF) 20 mg tablet; TAKE 2 TABLETS ORALLY TWICE PER DAY FOR 25 DAYS (Patient not taking: Reported on 5/19/2023)  -     Discontinue: predniSONE 20 mg tablet; PLEASE SEE ATTACHED FOR DETAILED DIRECTIONS (Patient not taking: Reported on 5/19/2023)  -     Discontinue: Sulfacetamide Sodium-Sulfur 10-5 % LIQD; [facility] THE AFFECTED AREA(S) BY TOPICAL ROUTE ONCE DAILY (Patient not taking: Reported on 5/19/2023)  -     terconazole (TERAZOL 7) 0 4 % vaginal cream  -     clonazePAM (KlonoPIN) 0 5 mg tablet; clonazepam 0 5 mg tablet   TAKE 1/2 TABLET BY MOUTH IN THE MORNING AND AT BEDTIME  -     triamcinolone (KENALOG) 0 1 % cream; APPLY TWICE DAILY TO RASH ON BODY UP TO 2 WEEKS  -     triamcinolone (KENALOG) 0 1 % ointment; PLEASE SEE ATTACHED FOR DETAILED DIRECTIONS  -     mometasone (ELOCON) 0 1 % ointment; APPLY SPARINGLY TO AFFECTED AREA EVERY DAY FOR 10 DAYS  -     pimecrolimus (ELIDEL) 1 % cream; APPLY TWICE DAILY TO AFFECTED AREAS ON FACE AS NEEDED  -     pregabalin (LYRICA) 50 mg capsule; TAKE 1 CAPSULE BY MOUTH TWICE A DAY AS DIRECTED              Return if symptoms worsen or fail to improve  Subjective:      Patient ID: Naomi Estevez is a 45 y o  female  Chief Complaint   Patient presents with   • Follow-up     Medication, blood work request nm lpn       HPI  Sees derm for eczema and allergy  Seeing psychiatrist, 3w ago, dx is MIKE/depression  On lyrica from [facility] for dx of MIKE  Told to wean lyrica  He is looking into a regimen for her    Valtrex prn for cold sores  helps    Stress from upcoming daughter's wedding    Bad rash 2w ago  Face, perioral, forehead  Improving but not sure of cause  Dermatologist did see her   Biopsy taken? No known trigger  Afraid it will recur, especially before the wedding    Saw derm and rheum    Not in menopause yet per pt, periods getting irregular    The following portions of the patient's history were reviewed and updated as appropriate: allergies, current medications, past family history, past medical history, past social history, past surgical history and problem list     Review of Systems   Constitutional: Negative for fever  Respiratory: Negative for shortness of breath  Psychiatric/Behavioral: The patient is nervous/anxious            Current Outpatient Medications   Medication Sig Dispense Refill   • ascorbic acid (VITAMIN C) 250 MG tablet Take 250 mg by mouth daily     • Cholecalciferol (VITAMIN D3) 1000 UNIT/SPRAY LIQD Vitamin D3     • clonazePAM (KlonoPIN) 0 5 mg tablet clonazepam 0 5 mg tablet   TAKE 1/2 TABLET BY MOUTH IN THE MORNING AND AT BEDTIME "  • clotrimazole-betamethasone (LOTRISONE) 1-0 05 % cream Apply topically 2 (two) times a day Short term, sparingly to area: short term, lip 45 g 0   • Eucrisa 2 % OINT      • hydrocortisone 2 5 % ointment APPLY TO FACE TWICE DAILY AS DIRECTED FOR 2 WEEKS     • meclizine (ANTIVERT) 25 mg tablet Take 1 tablet (25 mg total) by mouth 3 (three) times a day as needed for dizziness 30 tablet 0   • mometasone (ELOCON) 0 1 % ointment APPLY SPARINGLY TO AFFECTED AREA EVERY DAY FOR 10 DAYS     • pimecrolimus (ELIDEL) 1 % cream APPLY TWICE DAILY TO AFFECTED AREAS ON FACE AS NEEDED     • pregabalin (LYRICA) 50 mg capsule TAKE 1 CAPSULE BY MOUTH TWICE A DAY AS DIRECTED     • terconazole (TERAZOL 7) 0 4 % vaginal cream      • triamcinolone (KENALOG) 0 1 % cream APPLY TWICE DAILY TO RASH ON BODY UP TO 2 WEEKS  • triamcinolone (KENALOG) 0 1 % ointment PLEASE SEE ATTACHED FOR DETAILED DIRECTIONS     • valACYclovir (VALTREX) 1,000 mg tablet Take 2 grams at earliest onset of cold sore then repeat once more in 12 hours 40 tablet 1   • pregabalin (LYRICA) 75 mg capsule Take 75 mg by mouth 3 (three) times a day (Patient not taking: Reported on 5/19/2023)       No current facility-administered medications for this visit  Objective:    /70   Pulse 72   Temp 97 8 °F (36 6 °C)   Resp 16   Ht 5' 4\" (1 626 m)   Wt 68 6 kg (151 lb 3 2 oz)   BMI 25 95 kg/m²        Physical Exam  Vitals and nursing note reviewed  Constitutional:       General: She is not in acute distress  Appearance: She is well-developed  She is not ill-appearing  HENT:      Head: Normocephalic  Right Ear: Tympanic membrane normal       Left Ear: Tympanic membrane normal    Eyes:      General: No scleral icterus  Conjunctiva/sclera: Conjunctivae normal    Neck:      Vascular: No carotid bruit  Cardiovascular:      Rate and Rhythm: Normal rate and regular rhythm  Heart sounds: No murmur heard    Pulmonary:      Effort: Pulmonary " effort is normal  No respiratory distress  Breath sounds: No wheezing  Abdominal:      Palpations: Abdomen is soft  Musculoskeletal:         General: No deformity  Cervical back: Neck supple  No tenderness  Lymphadenopathy:      Cervical: No cervical adenopathy  Skin:     General: Skin is warm and dry  Coloration: Skin is not pale  Findings: Rash present  Comments: Faint macular rash upper lip  Right angular cheilitis   Neurological:      Mental Status: She is alert  Motor: No weakness  Gait: Gait normal    Psychiatric:         Mood and Affect: Mood normal          Behavior: Behavior normal          Thought Content:  Thought content normal            Galileo Bryant DO

## 2023-05-26 LAB
ALMOND IGE QN: <0.1 KU/L
CASHEW NUT IGE QN: <0.1 KU/L
CODFISH IGE QN: <0.1 KU/L
DEPRECATED ALMOND IGE RAST QL: 0
DEPRECATED CASHEW NUT IGE RAST QL: 0
DEPRECATED CODFISH IGE RAST QL: 0
DEPRECATED EGG WHITE IGE RAST QL: 0
DEPRECATED HAZELNUT IGE RAST QL: 3
DEPRECATED MILK IGE RAST QL: 0
DEPRECATED PEANUT IGE RAST QL: 0
DEPRECATED SALMON IGE RAST QL: 0
DEPRECATED SCALLOP IGE RAST QL: 0
DEPRECATED SESAME SEED IGE RAST QL: 0
DEPRECATED SHRIMP IGE RAST QL: 0
DEPRECATED SOYBEAN IGE RAST QL: 0
DEPRECATED TUNA IGE RAST QL: 0
DEPRECATED WALNUT IGE RAST QL: 0
DEPRECATED WHEAT IGE RAST QL: 0
EGG WHITE IGE QN: <0.1 KU/L
HAZELNUT IGE QN: 4.54 KU/L
IGA SERPL-MCNC: 163 MG/DL (ref 47–310)
MICRODELETION SYND BLD/T FISH: NORMAL
MILK IGE QN: <0.1 KU/L
PEANUT IGE QN: <0.1 KU/L
SALMON IGE QN: <0.1 KU/L
SCALLOP IGE QN: <0.1 KU/L
SESAME SEED IGE QN: <0.1 KU/L
SHRIMP IGE QN: <0.1 KU/L
SOYBEAN IGE QN: <0.1 KU/L
TTG IGA SER-ACNC: <1 U/ML
TUNA IGE QN: <0.1 KU/L
WALNUT IGE QN: <0.1 KU/L
WHEAT IGE QN: <0.1 KU/L

## 2023-06-12 ENCOUNTER — OFFICE VISIT (OUTPATIENT)
Dept: ENDOCRINOLOGY | Facility: CLINIC | Age: 53
End: 2023-06-12
Payer: COMMERCIAL

## 2023-06-12 VITALS
DIASTOLIC BLOOD PRESSURE: 60 MMHG | WEIGHT: 149 LBS | SYSTOLIC BLOOD PRESSURE: 110 MMHG | HEART RATE: 71 BPM | HEIGHT: 64 IN | BODY MASS INDEX: 25.44 KG/M2

## 2023-06-12 DIAGNOSIS — R89.9 ABNORMAL LABORATORY TEST: ICD-10-CM

## 2023-06-12 DIAGNOSIS — E05.90 SUBCLINICAL HYPERTHYROIDISM: Primary | ICD-10-CM

## 2023-06-12 DIAGNOSIS — F41.1 GAD (GENERALIZED ANXIETY DISORDER): ICD-10-CM

## 2023-06-12 PROCEDURE — 99204 OFFICE O/P NEW MOD 45 MIN: CPT | Performed by: INTERNAL MEDICINE

## 2023-06-12 RX ORDER — DUPILUMAB 300 MG/2ML
INJECTION, SOLUTION SUBCUTANEOUS
COMMUNITY
Start: 2023-06-07

## 2023-06-12 RX ORDER — DEXAMETHASONE 1 MG
1 TABLET ORAL ONCE
Qty: 1 TABLET | Refills: 0 | Status: SHIPPED | OUTPATIENT
Start: 2023-06-12 | End: 2023-06-12

## 2023-06-12 NOTE — PROGRESS NOTES
Emanuel Hawkins 45 y o  female MRN: [id number]    Encounter: [id number]    New pt progress note    Impression:  Abnormal Cortisol levels  Subclinical hyperthyroidism  Hx of Anxiety     Assessment: This is a 45y o -year-old female with past medical history of anxiety, subclinical hyperthyroidism  Presents to the clinic for a consult on abnormal cortisol levels and saliva    Abnormal cortisol levels  The patient reports that the cortisol level in  saliva was tested because she has history of anxiety and she had a eczema in her face  Cortisol level was tested and was above 120 at 8 AM 1 PM, 5 PM and midnight  Also the patient reports that she was taking prednisone 20 mg, for a total 12 days and she took the prednisone 1 day before the saliva test was done  We explained to the patient that we do not suspect an Cushing syndrome, and that this altered cortisol levels in saliva is because she was taking prednisone  However we will do a dexamethasone suppression test    Subclinical hyperthyroidism  TSH 0 3   Free T4: 1 1  (1/2023)  Patient has never been diagnosed with any thyroid disease has never been on medications  We explained to the patient that subclinical hyperthyroidism can cause anxiety  We will obtain a repeated thyroid panel and will include T3, will order TSI  If TSI is normal we will consider to obtain a thyroid ultrasound      CC: Abormal cortisol levels/Subclinical hyperthyroidism        HPI:  45years old female with past medical history of anxiety, who presents to the clinic for a consult on abnormal cortisol levels  The patient reports that she started experience anxiety since 20 years ago, however lately has been getting worse, also she reported that she developed the face eczema, for that reason she was started on prednisone for 12 days  Then  cortisol was tested in her saliva which was abnormal however the test was done and the patient took prednisone 1 day before    The patient does not have any signs or symptoms associated with Cushing's syndrome such as weakness, bruises, stretch marks, weight gain  The patient reports that she has never been diagnosed with any thyroid disease, has never been on any medications  Family history: Thyroid nodule (mother) no cancer    Review of Systems   Constitutional: Negative for activity change and appetite change  HENT: Negative for congestion and facial swelling  Eyes: Negative for photophobia and discharge  Respiratory: Negative for apnea, shortness of breath and wheezing  Cardiovascular: Negative for chest pain and palpitations  Gastrointestinal: Negative for abdominal distention and abdominal pain  Endocrine: Negative for cold intolerance, heat intolerance and polydipsia  Musculoskeletal: Negative for arthralgias and back pain  Skin: Negative for color change and wound  Neurological: Negative for tremors, weakness and headaches  Psychiatric/Behavioral: Negative for agitation, behavioral problems and confusion  Historical Information   Past Medical History:   Diagnosis Date   • Anxiety    • Disease of thyroid gland      History reviewed  No pertinent surgical history    Social History   Social History     Substance and Sexual Activity   Alcohol Use Never     Social History     Substance and Sexual Activity   Drug Use Never     Social History     Tobacco Use   Smoking Status Never   Smokeless Tobacco Never     Family History:   Family History   Problem Relation Age of Onset   • Arthritis Family        Meds/Allergies   Current Outpatient Medications   Medication Sig Dispense Refill   • ascorbic acid (VITAMIN C) 250 MG tablet Take 250 mg by mouth daily     • Cholecalciferol (VITAMIN D3) 1000 UNIT/SPRAY LIQD Vitamin D3     • clonazePAM (KlonoPIN) 0 5 mg tablet clonazepam 0 5 mg tablet   TAKE 1/2 TABLET BY MOUTH IN THE MORNING AND AT BEDTIME     • clotrimazole-betamethasone (LOTRISONE) 1-0 05 % cream Apply topically 2 (two) times a day "Short term, sparingly to area: short term, lip 45 g 0   • dexamethasone (DECADRON) 1 mg tablet Take 1 tablet (1 mg total) by mouth 1 (one) time for 1 dose 1 tablet 0   • hydrocortisone 2 5 % ointment APPLY TO FACE TWICE DAILY AS DIRECTED FOR 2 WEEKS     • meclizine (ANTIVERT) 25 mg tablet Take 1 tablet (25 mg total) by mouth 3 (three) times a day as needed for dizziness 30 tablet 0   • mometasone (ELOCON) 0 1 % ointment APPLY SPARINGLY TO AFFECTED AREA EVERY DAY FOR 10 DAYS     • pimecrolimus (ELIDEL) 1 % cream APPLY TWICE DAILY TO AFFECTED AREAS ON FACE AS NEEDED     • pregabalin (LYRICA) 75 mg capsule Take 75 mg by mouth 3 (three) times a day     • triamcinolone (KENALOG) 0 1 % cream APPLY TWICE DAILY TO RASH ON BODY UP TO 2 WEEKS  • triamcinolone (KENALOG) 0 1 % ointment PLEASE SEE ATTACHED FOR DETAILED DIRECTIONS     • valACYclovir (VALTREX) 1,000 mg tablet Take 2 grams at earliest onset of cold sore then repeat once more in 12 hours 40 tablet 1   • Dupixent 300 MG/2ML SOPN  (Patient not taking: Reported on 6/12/2023)     • Eucrisa 2 % OINT  (Patient not taking: Reported on 6/12/2023)     • pregabalin (LYRICA) 50 mg capsule TAKE 1 CAPSULE BY MOUTH TWICE A DAY AS DIRECTED     • terconazole (TERAZOL 7) 0 4 % vaginal cream  (Patient not taking: Reported on 6/12/2023)       No current facility-administered medications for this visit  Allergies   Allergen Reactions   • Cefuroxime Rash   • Penicillins        Objective   Vitals: Blood pressure 110/60, pulse 71, height 5' 4\" (1 626 m), weight 67 6 kg (149 lb), not currently breastfeeding  Physical Exam  Constitutional:       Appearance: Normal appearance  HENT:      Nose: No congestion or rhinorrhea  Mouth/Throat:      Pharynx: No oropharyngeal exudate or posterior oropharyngeal erythema  Eyes:      Conjunctiva/sclera: Conjunctivae normal       Pupils: Pupils are equal, round, and reactive to light     Cardiovascular:      Rate and Rhythm: Normal rate " "and regular rhythm  Pulses: Normal pulses  Heart sounds: No murmur heard  No friction rub  Pulmonary:      Effort: No respiratory distress  Breath sounds: No stridor  No wheezing  Abdominal:      General: There is no distension  Palpations: Abdomen is soft  Tenderness: There is no abdominal tenderness  Musculoskeletal:         General: No swelling  Cervical back: No rigidity or tenderness  Skin:     Coloration: Skin is not jaundiced  Findings: No bruising  Neurological:      General: No focal deficit present  Mental Status: She is alert and oriented to person, place, and time  Motor: No weakness  Psychiatric:         Mood and Affect: Mood normal          Thought Content: Thought content normal          Judgment: Judgment normal              Lab Results:   Lab Results   Component Value Date/Time    Free t4 1 1 01/14/2023 10:57 AM           Portions of the record may have been created with voice recognition software  Occasional wrong word or \"sound a like\" substitutions may have occurred due to the inherent limitations of voice recognition software  Read the chart carefully and recognize, using context, where substitutions have occurred    "

## 2023-06-12 NOTE — PATIENT INSTRUCTIONS
Take dexamethasone 1 mg at 10 pm then next morning check cortisol level in the AM between 7-9 AM  Get blood work done: TSH, FREE T4, T3, TSI antibody

## 2023-06-14 PROBLEM — R89.9 ABNORMAL LABORATORY TEST: Status: ACTIVE | Noted: 2023-06-14

## 2023-06-19 LAB
CORTIS AM PEAK SERPL-MCNC: 1.7 MCG/DL
T3 SERPL-MCNC: 107 NG/DL (ref 76–181)
TSH SERPL-ACNC: 0.29 MIU/L
TSI SER-ACNC: <89 % BASELINE

## 2023-06-21 ENCOUNTER — TELEPHONE (OUTPATIENT)
Dept: OTHER | Facility: HOSPITAL | Age: 53
End: 2023-06-21

## 2023-06-28 DIAGNOSIS — E05.90 SUBCLINICAL HYPERTHYROIDISM: Primary | ICD-10-CM

## 2023-06-29 LAB
T3 SERPL-MCNC: 105 NG/DL (ref 76–181)
T4 FREE SERPL-MCNC: 1.1 NG/DL (ref 0.8–1.8)
TSH SERPL-ACNC: 0.4 MIU/L
TSI SER-ACNC: <89 % BASELINE

## 2023-07-18 PROBLEM — Z13.820 SCREENING FOR OSTEOPOROSIS: Status: RESOLVED | Noted: 2023-05-19 | Resolved: 2023-07-18

## 2023-07-25 DIAGNOSIS — Z13.820 SCREENING FOR OSTEOPOROSIS: ICD-10-CM

## 2023-07-25 LAB
T4 FREE SERPL-MCNC: 1.1 NG/DL (ref 0.8–1.8)
TSH SERPL-ACNC: 0.28 MIU/L

## 2023-07-26 ENCOUNTER — TELEPHONE (OUTPATIENT)
Dept: OTHER | Facility: OTHER | Age: 53
End: 2023-07-26

## 2023-07-26 NOTE — TELEPHONE ENCOUNTER
Pt. And Spouse have been trying to get in touch with someone in regards to results. Please call patient at [phone].

## 2023-07-27 ENCOUNTER — TELEPHONE (OUTPATIENT)
Dept: ENDOCRINOLOGY | Facility: CLINIC | Age: 53
End: 2023-07-27

## 2023-07-27 NOTE — TELEPHONE ENCOUNTER
Pt is requesting a call to speak with provider regarding several labs completed and imaging completed. Pt states she has not got a call back.

## 2023-07-27 NOTE — TELEPHONE ENCOUNTER
It appears that the patient missed our initial call regarding lab work   On labs done in 6/2023, thyroid function test within normal limits  Most recent labs with mildly suppressed TSH  Cortisol post dexamethasone suppression test within normal limits  We can discuss further during her follow-up

## 2023-08-02 ENCOUNTER — TELEPHONE (OUTPATIENT)
Dept: PSYCHIATRY | Facility: CLINIC | Age: 53
End: 2023-08-02

## 2023-08-02 ENCOUNTER — TELEPHONE (OUTPATIENT)
Dept: ENDOCRINOLOGY | Facility: CLINIC | Age: 53
End: 2023-08-02

## 2023-08-02 NOTE — TELEPHONE ENCOUNTER
Patient has been added to the Medication Management wait list without a referral.    Insurance: [address] [place] [address] Type:    Commercial []   Medicaid []   [place] (if applicable)   Medicare [x]  Location Preference: [place]  Provider Preference: Per Jeff Viera  Virtual: Yes [x] No []      Advised the patient to contact her PCP for a referral.

## 2023-08-02 NOTE — TELEPHONE ENCOUNTER
Spoke with patient and reviewed Labs and she verbalized understanding.  Also explained to patient that Dr Reyes Sidle will review the ultrasound findings in her next follow up visit

## 2023-08-02 NOTE — TELEPHONE ENCOUNTER
Patients  (Joel Benitez) called and was inquiring about Anitra's labs. He is her emergency contact but I don't see medical consent. Please call patient to review labs. She is not coming in until 9-11-23 and is anxious to speak to someone. Please advise.

## 2023-09-10 ENCOUNTER — TELEPHONE (OUTPATIENT)
Dept: OTHER | Facility: OTHER | Age: 53
End: 2023-09-10

## 2023-09-10 NOTE — TELEPHONE ENCOUNTER
P/t calling and stated needs to cancel her appointment for 9/11/2023 @ 8:20 am. Please call back to r/s

## 2023-09-20 ENCOUNTER — OFFICE VISIT (OUTPATIENT)
Dept: ENDOCRINOLOGY | Facility: CLINIC | Age: 53
End: 2023-09-20
Payer: COMMERCIAL

## 2023-09-20 VITALS
DIASTOLIC BLOOD PRESSURE: 70 MMHG | HEIGHT: 65 IN | HEART RATE: 64 BPM | BODY MASS INDEX: 24.32 KG/M2 | SYSTOLIC BLOOD PRESSURE: 102 MMHG | WEIGHT: 146 LBS | OXYGEN SATURATION: 99 %

## 2023-09-20 DIAGNOSIS — E04.1 THYROID NODULE: ICD-10-CM

## 2023-09-20 DIAGNOSIS — F41.1 GAD (GENERALIZED ANXIETY DISORDER): ICD-10-CM

## 2023-09-20 DIAGNOSIS — E05.90 SUBCLINICAL HYPERTHYROIDISM: Primary | ICD-10-CM

## 2023-09-20 PROCEDURE — 99214 OFFICE O/P EST MOD 30 MIN: CPT | Performed by: INTERNAL MEDICINE

## 2023-09-20 RX ORDER — DIAZEPAM 5 MG/1
5 TABLET ORAL 3 TIMES DAILY
COMMUNITY
Start: 2023-09-12

## 2023-09-20 RX ORDER — ESCITALOPRAM OXALATE 10 MG/1
TABLET ORAL
COMMUNITY

## 2023-09-20 RX ORDER — B-COMPLEX WITH VITAMIN C
1 TABLET ORAL
COMMUNITY

## 2023-09-20 RX ORDER — CHLORAL HYDRATE 500 MG
1000 CAPSULE ORAL DAILY
COMMUNITY

## 2023-09-20 RX ORDER — PHYTONADIONE 5 MG/1
5 TABLET ORAL ONCE
COMMUNITY

## 2023-09-20 RX ORDER — MULTIVIT-MIN/IRON FUM/FOLIC AC 7.5 MG-4
1 TABLET ORAL DAILY
COMMUNITY

## 2023-09-20 NOTE — PATIENT INSTRUCTIONS
Please get labs done as ordered   Repeat ultrasound around 1/2024  Try to get ultrasound images for review

## 2023-09-20 NOTE — PROGRESS NOTES
Gayla Capone 45 y.o. female MRN: [id number]    Encounter: [id number]      Assessment/Plan     1. Subclinical hyperthyroidism  2. Thyroid nodule  3. Anxiety  Thyroid antibodies negative  Has symptoms that could be associated with hyperthyroidism  -will repeat thyroid function test-TSH, free T4, T3  Based on results, will decide if we would like to proceed with a thyroid uptake scan or not. Briefly discussed management of hyperthyroidism  -Patient advised to request images of thyroid ultrasound for review  - will repeat ultrasound thyroid to assess interval change in 6 months. Unclear why such a short follow-up has been recommended by the radiologist      CC:  Subclinical hyperthyroidism       History of Present Illness     HPI:  Gayla Capone is a 45 y.o. female who is here for a follow-up of subclinical hypothyroidism, thyroid nodule    Last seen in 6/2023. Patient was initially seen for a new consult for abnormal salivary cortisol levels    Has anxiety, maybe slightly better as compared to before   Was feeling nauseous for the last 2 months, now improved   Lost weight and then gained some   Has some shakes/ tremors, also palpitations    Has been waking up at night and takes time to fall asleep   Thinks her symptoms may have been related to discontinuation of lyrica and starting lexapro   No heat/ cold intolerance   No diarrhea/ constipation   No hair/ nail/ skin changes     Ultrasound thyroid 7/2023-mention of thickened isthmus, 0.34 cm thyroid nodule, TI-RADS 3 however there is a recommendation for the report to repeat an ReSound in 6 months    Hyperlipidemia - does not want to start statin; wants to manage with lifestyle modifications      All other systems were reviewed and are negative. Review of Systems    Historical Information   Past Medical History:   Diagnosis Date   • Anxiety    • Disease of thyroid gland      History reviewed. No pertinent surgical history.   Social History   Social History Substance and Sexual Activity   Alcohol Use Never     Social History     Substance and Sexual Activity   Drug Use Never     Social History     Tobacco Use   Smoking Status Never   Smokeless Tobacco Never     Family History:   Family History   Problem Relation Age of Onset   • Stroke Mother    • Heart attack Father    • Autoimmune disease Sister    • Heart attack Maternal Uncle    • Thyroid cancer Paternal Aunt    • Joint hypermobility Paternal Uncle    • Stroke Maternal Grandmother    • Autoimmune disease Paternal Grandmother    • Stroke Paternal Grandfather    • Prostate cancer Paternal Grandfather    • Arthritis Family        Meds/Allergies   Current Outpatient Medications   Medication Sig Dispense Refill   • ascorbic acid (VITAMIN C) 250 MG tablet Take 250 mg by mouth daily     • calcium carbonate-vitamin D 500 mg-5 mcg per tablet Take 1 tablet by mouth daily with breakfast     • Cholecalciferol (VITAMIN D3) 1000 UNIT/SPRAY LIQD Vitamin D3     • diazepam (VALIUM) 5 mg tablet Take 5 mg by mouth 3 (three) times a day     • dupilumab (DUPIXENT) subcutaneous injection Inject 200 mg under the skin every 14 (fourteen) days     • escitalopram (LEXAPRO) 10 mg tablet Pt.  Stated she is taking 7.5 mg     • magnesium hydroxide ([place] CHEWS) 311 MG CHEW Chew 311 mg every 4 (four) hours as needed     • Multiple Vitamins-Minerals (multivitamin with minerals) tablet Take 1 tablet by mouth daily     • Omega-3 Fatty Acids (fish oil) 1,000 mg Take 1,000 mg by mouth daily     • phytonadione (MEPHYTON) 5 mg tablet Take 5 mg by mouth once     • clonazePAM (KlonoPIN) 0.5 mg tablet clonazepam 0.5 mg tablet   TAKE 1/2 TABLET BY MOUTH IN THE MORNING AND AT BEDTIME (Patient not taking: Reported on 9/20/2023)     • clotrimazole-betamethasone (LOTRISONE) 1-0.05 % cream Apply topically 2 (two) times a day Short term, sparingly to area: short term, lip (Patient not taking: Reported on 9/20/2023) 45 g 0   • Dupixent 300 MG/2ML SOPN (Patient not taking: Reported on 6/12/2023)     • Eucrisa 2 % OINT  (Patient not taking: Reported on 6/12/2023)     • hydrocortisone 2.5 % ointment APPLY TO FACE TWICE DAILY AS DIRECTED FOR 2 WEEKS (Patient not taking: Reported on 9/20/2023)     • meclizine (ANTIVERT) 25 mg tablet Take 1 tablet (25 mg total) by mouth 3 (three) times a day as needed for dizziness 30 tablet 0   • mometasone (ELOCON) 0.1 % ointment APPLY SPARINGLY TO AFFECTED AREA EVERY DAY FOR 10 DAYS (Patient not taking: Reported on 9/20/2023)     • pimecrolimus (ELIDEL) 1 % cream APPLY TWICE DAILY TO AFFECTED AREAS ON FACE AS NEEDED (Patient not taking: Reported on 9/20/2023)     • pregabalin (LYRICA) 50 mg capsule TAKE 1 CAPSULE BY MOUTH TWICE A DAY AS DIRECTED (Patient not taking: Reported on 9/20/2023)     • pregabalin (LYRICA) 75 mg capsule Take 75 mg by mouth 3 (three) times a day (Patient not taking: Reported on 9/20/2023)     • terconazole (TERAZOL 7) 0.4 % vaginal cream  (Patient not taking: Reported on 6/12/2023)     • triamcinolone (KENALOG) 0.1 % cream APPLY TWICE DAILY TO RASH ON BODY UP TO 2 WEEKS. (Patient not taking: Reported on 9/20/2023)     • triamcinolone (KENALOG) 0.1 % ointment PLEASE SEE ATTACHED FOR DETAILED DIRECTIONS (Patient not taking: Reported on 9/20/2023)     • valACYclovir (VALTREX) 1,000 mg tablet Take 2 grams at earliest onset of cold sore then repeat once more in 12 hours (Patient not taking: Reported on 9/20/2023) 40 tablet 1     No current facility-administered medications for this visit. Allergies   Allergen Reactions   • Cefuroxime Rash   • Penicillins        Objective   Vitals: Blood pressure 102/70, pulse 64, height 5' 4.5" (1.638 m), weight 66.2 kg (146 lb), SpO2 99 %, not currently breastfeeding. Physical Exam  Constitutional:       Appearance: She is well-developed. HENT:      Head: Normocephalic and atraumatic.    Eyes:      Conjunctiva/sclera: Conjunctivae normal.      Pupils: Pupils are equal, round, and reactive to light. Neck:      Thyroid: No thyromegaly. Comments: No thyromegaly   Nontender, no nodules appreciated on palpation    Cardiovascular:      Rate and Rhythm: Normal rate and regular rhythm. Heart sounds: Normal heart sounds. No murmur heard. Pulmonary:      Effort: Pulmonary effort is normal.      Breath sounds: Normal breath sounds. No wheezing. Abdominal:      General: There is no distension. Palpations: Abdomen is soft. Tenderness: There is no abdominal tenderness. Musculoskeletal:         General: Normal range of motion. Cervical back: Normal range of motion and neck supple. Skin:     General: Skin is warm and dry. Neurological:      Mental Status: She is alert and oriented to person, place, and time. Deep Tendon Reflexes: Reflexes normal.      Comments: No tremors on the outstretched arms       Psychiatric:         Behavior: Behavior normal.         The history was obtained from the review of the chart, patient.     Lab Results:   Lab Results   Component Value Date/Time    TSH W/RFX TO FREE T4 0.28 (L) 07/24/2023 01:42 PM    Free t4 1.1 07/24/2023 01:42 PM    Free t4 1.1 06/26/2023 01:28 PM    Free t4 1.1 01/14/2023 10:57 AM     Lab Results   Component Value Date    WBC 4.33 01/22/2021    HGB 12.5 01/22/2021    HCT 38.1 01/22/2021    MCV 97 01/22/2021     01/22/2021     Lab Results   Component Value Date    CREATININE 0.69 01/14/2023    BUN 18 01/14/2023    K 4.3 01/14/2023     01/14/2023    CO2 31 01/14/2023     No results found for: "HGBA1C"  Component      Latest Ref Rng 1/14/2023 6/15/2023   Free T4      0.8 - 1.8 ng/dL 1.1     TSH, POC      mIU/L 0.35 (L)  0.29 (L)    THYROID STIMULATING IMMUNOGLOBULIN      <140 % baseline  <89    T3, Total      76 - 181 ng/dL  107    TSH W/RFX TO FREE T4      mIU/L       Component      Latest Ref Rng 6/26/2023 7/24/2023   Free T4      0.8 - 1.8 ng/dL  1.1    TSH, POC      mIU/L     THYROID STIMULATING IMMUNOGLOBULIN      <140 % baseline <89     T3, Total      76 - 181 ng/dL 105     TSH W/RFX TO FREE T4      mIU/L  0.28 (L)       Imaging Studies:       I have personally reviewed pertinent reports. Portions of the record may have been created with voice recognition software. Occasional wrong word or "sound a like" substitutions may have occurred due to the inherent limitations of voice recognition software. Read the chart carefully and recognize, using context, where substitutions have occurred.

## 2023-09-21 ENCOUNTER — TELEPHONE (OUTPATIENT)
Dept: PSYCHIATRY | Facility: CLINIC | Age: 53
End: 2023-09-21

## 2023-09-21 NOTE — TELEPHONE ENCOUNTER
Pts  called in and was looking to schedule his wife for an appt with Dr Jennifer Castellon. Writer explained that his wife is currently on the wait list and someone from intake will call her when there is an appt available.

## 2024-01-17 ENCOUNTER — OFFICE VISIT (OUTPATIENT)
Dept: ENDOCRINOLOGY | Facility: CLINIC | Age: 54
End: 2024-01-17
Payer: COMMERCIAL

## 2024-01-17 VITALS
BODY MASS INDEX: 34.52 KG/M2 | SYSTOLIC BLOOD PRESSURE: 102 MMHG | DIASTOLIC BLOOD PRESSURE: 78 MMHG | OXYGEN SATURATION: 99 % | HEIGHT: 65 IN | WEIGHT: 207.2 LBS | HEART RATE: 72 BPM

## 2024-01-17 DIAGNOSIS — E05.90 SUBCLINICAL HYPERTHYROIDISM: ICD-10-CM

## 2024-01-17 DIAGNOSIS — E04.1 THYROID NODULE: Primary | ICD-10-CM

## 2024-01-17 PROCEDURE — 99214 OFFICE O/P EST MOD 30 MIN: CPT | Performed by: NURSE PRACTITIONER

## 2024-01-17 NOTE — ASSESSMENT & PLAN NOTE
Will repeat thyroid ultrasound per radiology recommendation to assess for interval change. Denies neck compressive symptoms.

## 2024-01-17 NOTE — ASSESSMENT & PLAN NOTE
Clinically symptoms improving. Will recheck thryoid function tests, CBC, and CMP. Will repeat thyroid ultrasound.   Depending may require thyroid uptake & scan.

## 2024-01-17 NOTE — PROGRESS NOTES
Established Patient Progress Note     CC: Subclinical hyperthyroidism  Thyroid nodule       Impression & Plan:    Problem List Items Addressed This Visit          Endocrine    Subclinical hyperthyroidism     Clinically symptoms improving. Will recheck thryoid function tests, CBC, and CMP. Will repeat thyroid ultrasound.   Depending may require thyroid uptake & scan.          Relevant Orders    TSH, 3rd generation    T4, free    T3    CBC and differential    Comprehensive metabolic panel    Thyroid nodule - Primary     Will repeat thyroid ultrasound per radiology recommendation to assess for interval change. Denies neck compressive symptoms.          Relevant Orders    US thyroid       Orders Placed This Encounter   Procedures    US thyroid     Standing Status:   Future     Standing Expiration Date:   1/17/2028     Scheduling Instructions:      No prep required.        Please bring your insurance cards and a form of photo ID.  Bring your order/script for the test if from a non - [facility] provider.        Arrive 15 minutes prior to your appointment time to register.            To schedule this appointment, please contact Central Scheduling at [phone].          TSH, 3rd generation     This is a patient instruction: This test is non-fasting. Please drink two glasses of water morning of bloodwork.        Standing Status:   Future     Standing Expiration Date:   7/17/2024    T4, free     Standing Status:   Future     Standing Expiration Date:   1/17/2025    T3     Standing Status:   Future     Standing Expiration Date:   1/17/2025    CBC and differential     This is a patient instruction: This test is non-fasting. Please drink two glasses of water morning of bloodwork.        Standing Status:   Future     Standing Expiration Date:   1/17/2025    Comprehensive metabolic panel     This is a patient instruction: Patient fasting for 8 hours or longer recommended.     Standing Status:   Future     Standing Expiration  Date:   1/17/2025       History of Present Illness:     Anitra Benitez is a 53 y.o. female with a history of subclinical hyperthyroidism and thyroid nodule. Last seen September 2023 for follow up. She was initially seen for consultation for abnormal salivary cortisol level, dexamethasone suppression test was appropriate.     For subclinical hyperthyroidism, last thyroid function tests from July 2023 demonstrated mildly low TSH and normal free T4. No history of cardiac disease. Normal DEXA scan from May 2023. Denies change in energy level or weight.  Improvement in sleeping patterns.  Some anxiety, improved. Denies jitteriness or tremors.  Denies tachycardia or palpitations.  Denies constipation or hyperdefecation.  Denies frequent headaches. Feels hot; however, patient seeing OB/GYN concerned this is more likely secondary to menopausal changes.     For thyroid nodule, most recent thyroid ultrasound from July 2023 demonstrated thickened istmus with 0.34 cm thyroid nodule that was TI-RADS 3 with a recommendation to reassess with ultrasound in 6 months. The indication for the above recommendation was unclear. Has not been completed as of yet. Denies neck compressive symptoms.     For hyperlipidemia, does not want to start statin therapy and would rather manage with lifestyle modifications.       Patient Active Problem List   Diagnosis    Arthropathy    Recurrent depression (HCC)    Screening for diabetes mellitus (DM)    Healthcare maintenance    Breast cancer screening    Colon cancer screening    Allergic rhinitis due to pollen    MIKE (generalized anxiety disorder)    Herpes gingivostomatitis    Subclinical hyperthyroidism    Dyshidrotic eczema    PROCTOR (dyspnea on exertion)    BPPV (benign paroxysmal positional vertigo), unspecified laterality    Neuropathy    Rash and nonspecific skin eruption    Angular cheilitis    Abnormal laboratory test    Thyroid nodule      Past Medical History:   Diagnosis Date    Anxiety      Disease of thyroid gland       History reviewed. No pertinent surgical history.   Family History   Problem Relation Age of Onset    Stroke Mother     Heart attack Father     Autoimmune disease Sister     Heart attack Maternal Uncle     Thyroid cancer Paternal Aunt     Joint hypermobility Paternal Uncle     Stroke Maternal Grandmother     Autoimmune disease Paternal Grandmother     Stroke Paternal Grandfather     Prostate cancer Paternal Grandfather     Arthritis Family      Social History     Tobacco Use    Smoking status: Never    Smokeless tobacco: Never   Substance Use Topics    Alcohol use: Never     Allergies   Allergen Reactions    Cefuroxime Rash    Latex Other (See Comments)    Penicillins Other (See Comments)       Current Outpatient Medications:     ascorbic acid (VITAMIN C) 250 MG tablet, Take 250 mg by mouth daily, Disp: , Rfl:     Cholecalciferol (VITAMIN D3) 1000 UNIT/SPRAY LIQD, 4000 units daily, Disp: , Rfl:     dupilumab (DUPIXENT) subcutaneous injection, Inject 200 mg under the skin every 14 (fourteen) days, Disp: , Rfl:     magnesium hydroxide (C[place]AM CHEWS) 311 MG CHEW, Chew 311 mg every 4 (four) hours as needed, Disp: , Rfl:     Multiple Vitamins-Minerals (multivitamin with minerals) tablet, Take 1 tablet by mouth daily, Disp: , Rfl:     Omega-3 Fatty Acids (fish oil) 1,000 mg, Take 1,000 mg by mouth daily, Disp: , Rfl:     phytonadione (MEPHYTON) 5 mg tablet, Take 5 mg by mouth once, Disp: , Rfl:     pregabalin (LYRICA) 75 mg capsule, Take 75 mg by mouth 2 (two) times a day, Disp: , Rfl:     calcium carbonate-vitamin D 500 mg-5 mcg per tablet, Take 1 tablet by mouth daily with breakfast (Patient not taking: Reported on 1/17/2024), Disp: , Rfl:     clonazePAM (KlonoPIN) 0.5 mg tablet, , Disp: , Rfl:     clotrimazole-betamethasone (LOTRISONE) 1-0.05 % cream, Apply topically 2 (two) times a day Short term, sparingly to area: short term, lip, Disp: 45 g, Rfl: 0    diazepam (VALIUM) 5 mg  "tablet, Take 5 mg by mouth 3 (three) times a day (Patient not taking: Reported on 1/17/2024), Disp: , Rfl:     Dupixent 300 MG/2ML SOPN, , Disp: , Rfl:     escitalopram (LEXAPRO) 10 mg tablet, Pt. Stated she is taking 7.5 mg (Patient not taking: Reported on 1/17/2024), Disp: , Rfl:     Eucrisa 2 % OINT, , Disp: , Rfl:     hydrocortisone 2.5 % ointment, , Disp: , Rfl:     meclizine (ANTIVERT) 25 mg tablet, Take 1 tablet (25 mg total) by mouth 3 (three) times a day as needed for dizziness, Disp: 30 tablet, Rfl: 0    mometasone (ELOCON) 0.1 % ointment, , Disp: , Rfl:     pimecrolimus (ELIDEL) 1 % cream, , Disp: , Rfl:     pregabalin (LYRICA) 50 mg capsule, , Disp: , Rfl:     terconazole (TERAZOL 7) 0.4 % vaginal cream, , Disp: , Rfl:     triamcinolone (KENALOG) 0.1 % cream, , Disp: , Rfl:     triamcinolone (KENALOG) 0.1 % ointment, , Disp: , Rfl:     valACYclovir (VALTREX) 1,000 mg tablet, Take 2 grams at earliest onset of cold sore then repeat once more in 12 hours, Disp: 40 tablet, Rfl: 1    Review of Systems  See HPI  All other systems reviewed and are negative.      Physical Exam:  Body mass index is 35.02 kg/m².  /78 (BP Location: Left arm, Patient Position: Sitting, Cuff Size: Large)   Pulse 72   Ht 5' 4.5\" (1.638 m)   Wt 94 kg (207 lb 3.2 oz)   SpO2 99%   BMI 35.02 kg/m²    Wt Readings from Last 3 Encounters:   01/17/24 94 kg (207 lb 3.2 oz)   09/20/23 66.2 kg (146 lb)   06/12/23 67.6 kg (149 lb)       Physical Exam  Vitals reviewed.   Constitutional:       Appearance: Normal appearance.   Cardiovascular:      Rate and Rhythm: Normal rate and regular rhythm.      Pulses: Normal pulses.      Heart sounds: Normal heart sounds.   Pulmonary:      Effort: Pulmonary effort is normal.      Breath sounds: Normal breath sounds.   Skin:     General: Skin is warm and dry.      Capillary Refill: Capillary refill takes less than 2 seconds.   Neurological:      General: No focal deficit present.      Mental Status: " She is alert and oriented to person, place, and time.      No tremors with outstretched arms.   Psychiatric:         Mood and Affect: Mood normal.         Behavior: Behavior normal.     Labs:     Lab Results   Component Value Date    CREATININE 0.69 01/14/2023    CREATININE 0.69 04/09/2022    CREATININE 0.73 01/22/2021    BUN 18 01/14/2023    K 4.3 01/14/2023     01/14/2023    CO2 31 01/14/2023     eGFR   Date Value Ref Range Status   01/14/2023 103 > OR = 60 mL/min/1.73m2 Final     Comment:     The eGFR is based on the CKD-EPI 2021 equation. To calculate   the new eGFR from a previous Creatinine or Cystatin C  result, go to https://www.kidney.org/professionals/  kdoqi/gfr%5Fcalculator     01/22/2021 95 ml/min/1.73sq m Final       Lab Results   Component Value Date    HDL 69 01/14/2023    TRIG 39 01/14/2023       Lab Results   Component Value Date    ALT 9 01/14/2023    AST 13 01/14/2023    ALKPHOS 63 01/14/2023       Lab Results   Component Value Date    FREET4 1.1 07/24/2023    TSI <89 06/26/2023       Discussed with the patient and all questioned fully answered. She will call me if any problems arise.    Follow-up appointment in 3 months.     Counseled patient on diagnostic results, prognosis, risk and benefit of treatment options, instruction for management, importance of treatment compliance, Risk  factor reduction and impressions    JOHAN Degroot

## 2024-01-21 LAB
ALBUMIN SERPL-MCNC: 4.3 G/DL (ref 3.8–4.9)
ALBUMIN/GLOB SERPL: 1.7 {RATIO} (ref 1.2–2.2)
ALP SERPL-CCNC: 85 IU/L (ref 44–121)
ALT SERPL-CCNC: 10 IU/L (ref 0–32)
AST SERPL-CCNC: 15 IU/L (ref 0–40)
BASOPHILS # BLD AUTO: 0.1 X10E3/UL (ref 0–0.2)
BASOPHILS NFR BLD AUTO: 1 %
BILIRUB SERPL-MCNC: 0.5 MG/DL (ref 0–1.2)
BUN SERPL-MCNC: 17 MG/DL (ref 6–24)
BUN/CREAT SERPL: 23 (ref 9–23)
CALCIUM SERPL-MCNC: 9.6 MG/DL (ref 8.7–10.2)
CHLORIDE SERPL-SCNC: 104 MMOL/L (ref 96–106)
CO2 SERPL-SCNC: 24 MMOL/L (ref 20–29)
CREAT SERPL-MCNC: 0.74 MG/DL (ref 0.57–1)
EGFR: 95 ML/MIN/1.73
EOSINOPHIL # BLD AUTO: 0.2 X10E3/UL (ref 0–0.4)
EOSINOPHIL NFR BLD AUTO: 3 %
ERYTHROCYTE [DISTWIDTH] IN BLOOD BY AUTOMATED COUNT: 12.1 % (ref 11.7–15.4)
GLOBULIN SER-MCNC: 2.6 G/DL (ref 1.5–4.5)
GLUCOSE SERPL-MCNC: 94 MG/DL (ref 70–99)
HCT VFR BLD AUTO: 39.4 % (ref 34–46.6)
HGB BLD-MCNC: 12.9 G/DL (ref 11.1–15.9)
IMM GRANULOCYTES # BLD: 0 X10E3/UL (ref 0–0.1)
IMM GRANULOCYTES NFR BLD: 0 %
LYMPHOCYTES # BLD AUTO: 1.8 X10E3/UL (ref 0.7–3.1)
LYMPHOCYTES NFR BLD AUTO: 36 %
MCH RBC QN AUTO: 30.9 PG (ref 26.6–33)
MCHC RBC AUTO-ENTMCNC: 32.7 G/DL (ref 31.5–35.7)
MCV RBC AUTO: 95 FL (ref 79–97)
MONOCYTES # BLD AUTO: 0.4 X10E3/UL (ref 0.1–0.9)
MONOCYTES NFR BLD AUTO: 7 %
NEUTROPHILS # BLD AUTO: 2.6 X10E3/UL (ref 1.4–7)
NEUTROPHILS NFR BLD AUTO: 53 %
PLATELET # BLD AUTO: 332 X10E3/UL (ref 150–450)
POTASSIUM SERPL-SCNC: 4 MMOL/L (ref 3.5–5.2)
PROT SERPL-MCNC: 6.9 G/DL (ref 6–8.5)
RBC # BLD AUTO: 4.17 X10E6/UL (ref 3.77–5.28)
SODIUM SERPL-SCNC: 143 MMOL/L (ref 134–144)
T3 SERPL-MCNC: 107 NG/DL (ref 71–180)
T4 FREE SERPL-MCNC: 1.25 NG/DL (ref 0.82–1.77)
TSH SERPL DL<=0.005 MIU/L-ACNC: 0.54 UIU/ML (ref 0.45–4.5)
WBC # BLD AUTO: 5 X10E3/UL (ref 3.4–10.8)

## 2024-02-21 PROBLEM — Z00.00 HEALTHCARE MAINTENANCE: Status: RESOLVED | Noted: 2018-05-07 | Resolved: 2024-02-21

## 2024-02-21 PROBLEM — Z12.39 BREAST CANCER SCREENING: Status: RESOLVED | Noted: 2018-05-07 | Resolved: 2024-02-21

## 2024-02-21 PROBLEM — Z12.11 COLON CANCER SCREENING: Status: RESOLVED | Noted: 2018-05-07 | Resolved: 2024-02-21

## 2024-02-21 PROBLEM — Z13.1 SCREENING FOR DIABETES MELLITUS (DM): Status: RESOLVED | Noted: 2018-03-26 | Resolved: 2024-02-21

## 2024-04-17 ENCOUNTER — OFFICE VISIT (OUTPATIENT)
Dept: ENDOCRINOLOGY | Facility: CLINIC | Age: 54
End: 2024-04-17
Payer: COMMERCIAL

## 2024-04-17 VITALS
DIASTOLIC BLOOD PRESSURE: 80 MMHG | OXYGEN SATURATION: 98 % | HEIGHT: 65 IN | WEIGHT: 152 LBS | BODY MASS INDEX: 25.33 KG/M2 | HEART RATE: 68 BPM | SYSTOLIC BLOOD PRESSURE: 120 MMHG

## 2024-04-17 DIAGNOSIS — E04.1 THYROID NODULE: ICD-10-CM

## 2024-04-17 DIAGNOSIS — E05.90 SUBCLINICAL HYPERTHYROIDISM: Primary | ICD-10-CM

## 2024-04-17 PROCEDURE — 99214 OFFICE O/P EST MOD 30 MIN: CPT | Performed by: NURSE PRACTITIONER

## 2024-04-17 RX ORDER — PROGESTERONE 100 MG/1
CAPSULE ORAL
COMMUNITY
Start: 2024-04-08

## 2024-04-17 RX ORDER — ESTRADIOL 0.5 MG/.5G
GEL TOPICAL
COMMUNITY
Start: 2024-04-09

## 2024-04-17 NOTE — ASSESSMENT & PLAN NOTE
PAP therapy: CPAP 12 CMWP QHS.  Arrange for home CPAP.   Recommend bringing in images for upload and review by radiology.   Denies neck compressive symptoms.

## 2024-04-17 NOTE — PROGRESS NOTES
Established Patient Progress Note     CC: Endocrinology follow up visit    Impression & Plan:    Problem List Items Addressed This Visit          Endocrine    Subclinical hyperthyroidism - Primary     Clinically euthyroid.  Recommend repeat thyroid function tests.          Relevant Orders    TSH, 3rd generation    T4, free    T3    Thyroid nodule     Recommend bringing in images for upload and review by radiology.   Denies neck compressive symptoms.             History of Present Illness:     Anitra Benitez is a 53 y.o. female with a history of subclinical hyperthyroidism and thyroid nodule. Last seen January 2024 for follow up.      For subclinical hyperthyroidism, last thyroid function tests from January 2024 demonstrated normal thyroid function tests. No history of cardiac disease. Normal DEXA scan from May 2023. Denies symptoms consistent with hypo/hyperthyroidism.      For thyroid nodule, most recent thyroid ultrasound from March 2024 which demonstrate interval increase in size in subcentimeter TR3 thyroid nodule. See radiology report scanned in chart. Denies neck compressive symptoms.     Patient Active Problem List   Diagnosis    Arthropathy    Recurrent depression (HCC)    Allergic rhinitis due to pollen    MIKE (generalized anxiety disorder)    Herpes gingivostomatitis    Subclinical hyperthyroidism    Dyshidrotic eczema    PROCTOR (dyspnea on exertion)    BPPV (benign paroxysmal positional vertigo), unspecified laterality    Neuropathy    Rash and nonspecific skin eruption    Angular cheilitis    Abnormal laboratory test    Thyroid nodule      Past Medical History:   Diagnosis Date    Anxiety     Disease of thyroid gland       History reviewed. No pertinent surgical history.   Family History   Problem Relation Age of Onset    Stroke Mother     Heart attack Father     Autoimmune disease Sister     Heart attack Maternal Uncle     Thyroid cancer Paternal Aunt     Joint hypermobility Paternal Uncle     Stroke  Maternal Grandmother     Autoimmune disease Paternal Grandmother     Stroke Paternal Grandfather     Prostate cancer Paternal Grandfather     Arthritis Family      Social History     Tobacco Use    Smoking status: Never    Smokeless tobacco: Never   Substance Use Topics    Alcohol use: Never     Allergies   Allergen Reactions    Cefuroxime Rash    Latex Other (See Comments)    Penicillins Other (See Comments)       Current Outpatient Medications:     ascorbic acid (VITAMIN C) 250 MG tablet, Take 250 mg by mouth daily, Disp: , Rfl:     Cholecalciferol (VITAMIN D3) 1000 UNIT/SPRAY LIQD, 4000 units daily, Disp: , Rfl:     magnesium hydroxide (C[place]AM CHEWS) 311 MG CHEW, Chew 311 mg every 4 (four) hours as needed, Disp: , Rfl:     Multiple Vitamins-Minerals (multivitamin with minerals) tablet, Take 1 tablet by mouth daily, Disp: , Rfl:     Omega-3 Fatty Acids (fish oil) 1,000 mg, Take 1,000 mg by mouth daily, Disp: , Rfl:     phytonadione (MEPHYTON) 5 mg tablet, Take 5 mg by mouth once, Disp: , Rfl:     pregabalin (LYRICA) 75 mg capsule, Take 75 mg by mouth 2 (two) times a day, Disp: , Rfl:     calcium carbonate-vitamin D 500 mg-5 mcg per tablet, Take 1 tablet by mouth daily with breakfast (Patient not taking: Reported on 1/17/2024), Disp: , Rfl:     clonazePAM (KlonoPIN) 0.5 mg tablet, , Disp: , Rfl:     clotrimazole-betamethasone (LOTRISONE) 1-0.05 % cream, Apply topically 2 (two) times a day Short term, sparingly to area: short term, lip (Patient not taking: Reported on 4/17/2024), Disp: 45 g, Rfl: 0    diazepam (VALIUM) 5 mg tablet, Take 5 mg by mouth 3 (three) times a day (Patient not taking: Reported on 1/17/2024), Disp: , Rfl:     dupilumab (DUPIXENT) subcutaneous injection, Inject 200 mg under the skin every 14 (fourteen) days, Disp: , Rfl:     Dupixent 300 MG/2ML SOPN, , Disp: , Rfl:     escitalopram (LEXAPRO) 10 mg tablet, Pt. Stated she is taking 7.5 mg (Patient not taking: Reported on 1/17/2024), Disp: ,  "Rfl:     Estradiol 0.5 MG/0.5GM GEL, , Disp: , Rfl:     Eucrisa 2 % OINT, , Disp: , Rfl:     hydrocortisone 2.5 % ointment, , Disp: , Rfl:     meclizine (ANTIVERT) 25 mg tablet, Take 1 tablet (25 mg total) by mouth 3 (three) times a day as needed for dizziness, Disp: 30 tablet, Rfl: 0    mometasone (ELOCON) 0.1 % ointment, , Disp: , Rfl:     pimecrolimus (ELIDEL) 1 % cream, , Disp: , Rfl:     pregabalin (LYRICA) 50 mg capsule, , Disp: , Rfl:     Progesterone 100 MG CAPS, , Disp: , Rfl:     terconazole (TERAZOL 7) 0.4 % vaginal cream, , Disp: , Rfl:     triamcinolone (KENALOG) 0.1 % cream, , Disp: , Rfl:     triamcinolone (KENALOG) 0.1 % ointment, , Disp: , Rfl:     valACYclovir (VALTREX) 1,000 mg tablet, Take 2 grams at earliest onset of cold sore then repeat once more in 12 hours, Disp: 40 tablet, Rfl: 1    Review of Systems  Constitutional: Negative for activity change, appetite change, fatigue and unexpected weight change.   HENT: Negative for ear pain, sore throat, trouble swallowing and voice change.    Respiratory: Negative for cough and shortness of breath.    Cardiovascular: Negative for chest pain and palpitations.   Gastrointestinal: Negative for abdominal distention, abdominal pain, constipation, diarrhea and vomiting.   Endocrine: Negative for cold intolerance, heat intolerance.   Musculoskeletal: Negative for arthralgias and back pain.   Skin: Negative for color change and rash.   Neurological: Negative for weakness or tremors.   All other systems reviewed and are negative.      Physical Exam:  Body mass index is 25.69 kg/m².  /80 (BP Location: Left arm, Patient Position: Sitting, Cuff Size: Large)   Pulse 68   Ht 5' 4.5\" (1.638 m)   Wt 68.9 kg (152 lb)   SpO2 98%   BMI 25.69 kg/m²    Wt Readings from Last 3 Encounters:   04/17/24 68.9 kg (152 lb)   01/17/24 94 kg (207 lb 3.2 oz)   09/20/23 66.2 kg (146 lb)     Physical Exam  Vitals reviewed.   Constitutional:       Appearance: Normal " appearance.   Cardiovascular:      Rate and Rhythm: Normal rate and regular rhythm.      Pulses: Normal pulses.      Heart sounds: Normal heart sounds.   Pulmonary:      Effort: Pulmonary effort is normal.      Breath sounds: Normal breath sounds.   Skin:     General: Skin is warm and dry.      Capillary Refill: Capillary refill takes less than 2 seconds.   Neurological:      General: No focal deficit present.      Mental Status: She is alert and oriented to person, place, and time.      No tremors with outstretched arms.   Psychiatric:         Mood and Affect: Mood normal.         Behavior: Behavior normal.       Labs:   Component      Latest Ref Rng 1/20/2024   FREE T4      0.82 - 1.77 ng/dL 1.25    TSH, POC      0.450 - 4.500 uIU/mL 0.536    T3, Total      71 - 180 ng/dL 107          Discussed with the patient and all questioned fully answered. She will call me if any problems arise.    Follow-up appointment in 3 months.     Counseled patient on diagnostic results, prognosis, risk and benefit of treatment options, instruction for management, importance of treatment compliance, Risk  factor reduction and impressions    JOHAN Degroot

## 2024-05-16 LAB
T3 SERPL-MCNC: 95 NG/DL (ref 71–180)
T4 FREE SERPL-MCNC: 1.24 NG/DL (ref 0.82–1.77)
TSH SERPL DL<=0.005 MIU/L-ACNC: 0.52 UIU/ML (ref 0.45–4.5)

## 2024-06-25 ENCOUNTER — TELEPHONE (OUTPATIENT)
Dept: ENDOCRINOLOGY | Facility: CLINIC | Age: 54
End: 2024-06-25

## 2024-07-25 ENCOUNTER — OFFICE VISIT (OUTPATIENT)
Dept: ENDOCRINOLOGY | Facility: CLINIC | Age: 54
End: 2024-07-25
Payer: COMMERCIAL

## 2024-07-25 VITALS
WEIGHT: 150 LBS | SYSTOLIC BLOOD PRESSURE: 110 MMHG | HEIGHT: 65 IN | HEART RATE: 78 BPM | OXYGEN SATURATION: 98 % | DIASTOLIC BLOOD PRESSURE: 70 MMHG | BODY MASS INDEX: 24.99 KG/M2

## 2024-07-25 DIAGNOSIS — E04.1 THYROID NODULE: Primary | ICD-10-CM

## 2024-07-25 DIAGNOSIS — E05.90 SUBCLINICAL HYPERTHYROIDISM: ICD-10-CM

## 2024-07-25 PROCEDURE — 99214 OFFICE O/P EST MOD 30 MIN: CPT | Performed by: NURSE PRACTITIONER

## 2024-07-25 RX ORDER — ALPRAZOLAM 0.25 MG/1
0.25 TABLET ORAL
COMMUNITY

## 2024-07-25 RX ORDER — TRAZODONE HYDROCHLORIDE 50 MG/1
50 TABLET ORAL
COMMUNITY

## 2024-07-25 RX ORDER — HYDROXYZINE HYDROCHLORIDE 25 MG/1
25 TABLET, FILM COATED ORAL
COMMUNITY
Start: 2024-06-21

## 2024-07-25 RX ORDER — DULOXETIN HYDROCHLORIDE 30 MG/1
30 CAPSULE, DELAYED RELEASE ORAL DAILY
COMMUNITY
Start: 2024-05-01

## 2024-07-25 RX ORDER — ACETAMINOPHEN 160 MG
2000 TABLET,DISINTEGRATING ORAL DAILY
COMMUNITY

## 2024-07-25 NOTE — PROGRESS NOTES
Established Patient Progress Note     CC: Endocrinology follow up visit    Impression & Plan:    Problem List Items Addressed This Visit          Endocrine    Subclinical hyperthyroidism    Relevant Orders    TSH, 3rd generation    T4, free    T3    Thyroid nodule - Primary     Denies neck compressive symptoms.            Relevant Orders    US thyroid       History of Present Illness:     Anitra Benitez is a 53 y.o. female with a history of subclinical hyperthyroidism and thyroid nodule.      For subclinical hyperthyroidism, last thyroid function tests from May 2024 demonstrated normal thyroid function tests. With the exception of difficulty sleeping denies symptoms consistent with hypo/hyperthyroidism. Has been following with PCP/OB/GYN regarding sleep has tried progesterone and melatonin.      For thyroid nodule, most recent thyroid ultrasound from March 2024 which demonstrate interval increase in size in subcentimeter TR3 thyroid nodule. See radiology report scanned in chart. Continues to deny neck compressive symptoms.     Patient Active Problem List   Diagnosis    Arthropathy    Recurrent depression (HCC)    Allergic rhinitis due to pollen    MIKE (generalized anxiety disorder)    Herpes gingivostomatitis    Subclinical hyperthyroidism    Dyshidrotic eczema    PROCTOR (dyspnea on exertion)    BPPV (benign paroxysmal positional vertigo), unspecified laterality    Neuropathy    Rash and nonspecific skin eruption    Angular cheilitis    Abnormal laboratory test    Thyroid nodule      Past Medical History:   Diagnosis Date    Anxiety     Disease of thyroid gland       History reviewed. No pertinent surgical history.   Family History   Problem Relation Age of Onset    Stroke Mother     Heart attack Father     Autoimmune disease Sister     Heart attack Maternal Uncle     Thyroid cancer Paternal Aunt     Joint hypermobility Paternal Uncle     Stroke Maternal Grandmother     Autoimmune disease Paternal Grandmother      Stroke Paternal Grandfather     Prostate cancer Paternal Grandfather     Arthritis Family     No Known Problems Daughter     No Known Problems Daughter      Social History     Tobacco Use    Smoking status: Never    Smokeless tobacco: Never   Substance Use Topics    Alcohol use: Never     Allergies   Allergen Reactions    Cefuroxime Rash    Latex Other (See Comments)    Penicillins Other (See Comments)       Current Outpatient Medications:     ascorbic acid (VITAMIN C) 250 MG tablet, Take 250 mg by mouth daily, Disp: , Rfl:     Cholecalciferol (VITAMIN D3) 1000 UNIT/SPRAY LIQD, 4000 units daily, Disp: , Rfl:     Cholecalciferol (Vitamin D3) 50 MCG (2000 UT) capsule, Take 2,000 Units by mouth daily, Disp: , Rfl:     magnesium hydroxide (CUNN[place]AM CHEWS) 311 MG CHEW, Chew 311 mg every 4 (four) hours as needed, Disp: , Rfl:     Multiple Vitamins-Minerals (multivitamin with minerals) tablet, Take 1 tablet by mouth daily, Disp: , Rfl:     Omega-3 Fatty Acids (fish oil) 1,000 mg, Take 1,000 mg by mouth daily, Disp: , Rfl:     pregabalin (LYRICA) 75 mg capsule, Take 75 mg by mouth 2 (two) times a day, Disp: , Rfl:     ALPRAZolam (XANAX) 0.25 mg tablet, Take 0.25 mg by mouth daily at bedtime as needed, Disp: , Rfl:     calcium carbonate-vitamin D 500 mg-5 mcg per tablet, Take 1 tablet by mouth daily with breakfast, Disp: , Rfl:     clonazePAM (KlonoPIN) 0.5 mg tablet, , Disp: , Rfl:     clotrimazole-betamethasone (LOTRISONE) 1-0.05 % cream, Apply topically 2 (two) times a day Short term, sparingly to area: short term, lip, Disp: 45 g, Rfl: 0    diazepam (VALIUM) 5 mg tablet, Take 5 mg by mouth 3 (three) times a day, Disp: , Rfl:     DULoxetine (CYMBALTA) 30 mg delayed release capsule, Take 30 mg by mouth daily (Patient not taking: Reported on 7/25/2024), Disp: , Rfl:     dupilumab (DUPIXENT) subcutaneous injection, Inject 200 mg under the skin every 14 (fourteen) days (Patient not taking: Reported on 7/25/2024), Disp: , Rfl:  "    Dupixent 300 MG/2ML SOPN, , Disp: , Rfl:     escitalopram (LEXAPRO) 10 mg tablet, Pt. Stated she is taking 7.5 mg, Disp: , Rfl:     Estradiol 0.5 MG/0.5GM GEL, , Disp: , Rfl:     Eucrisa 2 % OINT, , Disp: , Rfl:     hydrocortisone 2.5 % ointment, , Disp: , Rfl:     hydrOXYzine HCL (ATARAX) 25 mg tablet, Take 25 mg by mouth daily at bedtime (Patient not taking: Reported on 7/25/2024), Disp: , Rfl:     meclizine (ANTIVERT) 25 mg tablet, Take 1 tablet (25 mg total) by mouth 3 (three) times a day as needed for dizziness, Disp: 30 tablet, Rfl: 0    mometasone (ELOCON) 0.1 % ointment, , Disp: , Rfl:     phytonadione (MEPHYTON) 5 mg tablet, Take 5 mg by mouth once (Patient not taking: Reported on 7/25/2024), Disp: , Rfl:     pimecrolimus (ELIDEL) 1 % cream, , Disp: , Rfl:     pregabalin (LYRICA) 50 mg capsule, , Disp: , Rfl:     Progesterone 100 MG CAPS, , Disp: , Rfl:     terconazole (TERAZOL 7) 0.4 % vaginal cream, , Disp: , Rfl:     traZODone (DESYREL) 50 mg tablet, Take 50 mg by mouth daily at bedtime as needed (Patient not taking: Reported on 7/25/2024), Disp: , Rfl:     triamcinolone (KENALOG) 0.1 % cream, , Disp: , Rfl:     triamcinolone (KENALOG) 0.1 % ointment, , Disp: , Rfl:     valACYclovir (VALTREX) 1,000 mg tablet, Take 2 grams at earliest onset of cold sore then repeat once more in 12 hours, Disp: 40 tablet, Rfl: 1    Review of Systems  See HPI.   All other systems reviewed and are negative.      Physical Exam:  Body mass index is 25.35 kg/m².  /70 (BP Location: Left arm, Patient Position: Sitting, Cuff Size: Large)   Pulse 78   Ht 5' 4.5\" (1.638 m)   Wt 68 kg (150 lb)   SpO2 98%   BMI 25.35 kg/m²    Wt Readings from Last 3 Encounters:   07/25/24 68 kg (150 lb)   04/17/24 68.9 kg (152 lb)   01/17/24 94 kg (207 lb 3.2 oz)          Physical Exam  Vitals reviewed.   Constitutional:       Appearance: Normal appearance.   Cardiovascular:      Rate and Rhythm: Normal rate and regular rhythm.      " Pulses: Normal pulses.      Heart sounds: Normal heart sounds.   Pulmonary:      Effort: Pulmonary effort is normal.      Breath sounds: Normal breath sounds.   Skin:     General: Skin is warm and dry.      Capillary Refill: Capillary refill takes less than 2 seconds.   Neurological:      General: No focal deficit present.      Mental Status: She is alert and oriented to person, place, and time.   Psychiatric:         Mood and Affect: Mood normal.         Behavior: Behavior normal.       Labs:   Component      Latest Ref Rng 5/15/2024   FREE T4      0.82 - 1.77 ng/dL 1.24    TSH, POC      0.450 - 4.500 uIU/mL 0.520    T3, Total      71 - 180 ng/dL 95          Discussed with the patient and all questioned fully answered. She will call me if any problems arise.    Follow-up appointment in 6 months.     Counseled patient on diagnostic results, prognosis, risk and benefit of treatment options, instruction for management, importance of treatment compliance, Risk  factor reduction and impressions    JOHAN Degroot

## 2024-08-03 LAB
T3 SERPL-MCNC: 96 NG/DL (ref 71–180)
T4 FREE SERPL-MCNC: 1.43 NG/DL (ref 0.82–1.77)
TSH SERPL DL<=0.005 MIU/L-ACNC: 0.34 UIU/ML (ref 0.45–4.5)

## 2024-08-15 ENCOUNTER — TELEPHONE (OUTPATIENT)
Age: 54
End: 2024-08-15

## 2024-08-15 NOTE — TELEPHONE ENCOUNTER
Contacted patient for Medication Management  to verify needs of services in attempts to offer patient an appointment at [facility]. Writer verified N/A - NO ANSWER. Writer LVM and left callback number [phone]; option 3.    1st call attempt

## 2024-08-20 NOTE — TELEPHONE ENCOUNTER
Contacted patient for Medication Management  to verify needs of services in attempts to offer patient an appointment at [facility]. Writer verified N/A - NO ANSWER. Writer LVM and left callback number [phone]; option 3.    2nd call attempt, pt removed from wait list.

## 2024-12-13 ENCOUNTER — TELEPHONE (OUTPATIENT)
Dept: ENDOCRINOLOGY | Facility: CLINIC | Age: 54
End: 2024-12-13

## 2025-03-28 ENCOUNTER — OFFICE VISIT (OUTPATIENT)
Dept: ENDOCRINOLOGY | Facility: CLINIC | Age: 55
End: 2025-03-28
Payer: COMMERCIAL

## 2025-03-28 VITALS
HEIGHT: 65 IN | DIASTOLIC BLOOD PRESSURE: 72 MMHG | OXYGEN SATURATION: 99 % | HEART RATE: 66 BPM | WEIGHT: 152.8 LBS | SYSTOLIC BLOOD PRESSURE: 119 MMHG | BODY MASS INDEX: 25.46 KG/M2

## 2025-03-28 DIAGNOSIS — E05.90 SUBCLINICAL HYPERTHYROIDISM: ICD-10-CM

## 2025-03-28 DIAGNOSIS — E04.1 THYROID NODULE: Primary | ICD-10-CM

## 2025-03-28 PROCEDURE — 99213 OFFICE O/P EST LOW 20 MIN: CPT | Performed by: NURSE PRACTITIONER

## 2025-03-28 RX ORDER — AMPICILLIN TRIHYDRATE 250 MG
50 CAPSULE ORAL
COMMUNITY

## 2025-03-28 NOTE — PROGRESS NOTES
Name: Anitra Benitez      : 1970      MRN: [id number]  Encounter Provider: JOHAN Degroot  Encounter Date: 3/28/2025   Encounter department: [facility] [place]    No chief complaint on file.  :  Assessment & Plan  Thyroid nodule  Denies neck compressive symptoms.   Recommend 1 yr f/u ultrasound from 2024.     Orders:    US thyroid; Future    Subclinical hyperthyroidism  Clinically euthyroid. Last thyroid function tests from 2024 with mildly low TSH.   Will recommend recheck thyroid function tests.     Orders:    TSH, 3rd generation; Future    T4, free; Future    T3; Future        History of Present Illness     Anitra Benitez is a 54 y.o. female with a history of subclinical hyperthyroidism and thyroid nodule.      For subclinical hyperthyroidism, Denies symptoms consistent with hypo/hyperthyroidism.      For thyroid nodule, most recent thyroid ultrasound from 2024, stable subcentimeter thyroid nodule due to characteristics recommend 1 yr f/u. See radiology report scanned in chart. Continues to deny neck compressive symptoms.       Review of Systems as per HPI  Medical History Reviewed by provider this encounter:  Tobacco  Allergies  Meds  Problems  Med Hx  Surg Hx  Fam Hx     .  Current Outpatient Medications on File Prior to Visit   Medication Sig Dispense Refill    ALPRAZolam (XANAX) 0.25 mg tablet Take 0.25 mg by mouth daily at bedtime as needed      ascorbic acid (VITAMIN C) 250 MG tablet Take 250 mg by mouth daily      calcium carbonate-vitamin D 500 mg-5 mcg per tablet Take 1 tablet by mouth daily with breakfast      Cholecalciferol (Vitamin D3) 50 MCG ( UT) capsule Take 2,000 Units by mouth daily      Co Q-10 50 MG Take 50 mg by mouth      dupilumab (DUPIXENT) subcutaneous injection Inject 200 mg under the skin every 14 (fourteen) days      magnesium hydroxide ([place] CHEWS) 311 MG CHEW Chew 311 mg every 4 (four) hours  as needed      meclizine (ANTIVERT) 25 mg tablet Take 1 tablet (25 mg total) by mouth 3 (three) times a day as needed for dizziness 30 tablet 0    pregabalin (LYRICA) 75 mg capsule Take 75 mg by mouth 2 (two) times a day      Probiotic Product (PROBIOTIC ADVANCED PO) Probiotic      terconazole (TERAZOL 7) 0.4 % vaginal cream       valACYclovir (VALTREX) 1,000 mg tablet Take 2 grams at earliest onset of cold sore then repeat once more in 12 hours 40 tablet 1    [DISCONTINUED] Estradiol 0.5 MG/0.5GM GEL       [DISCONTINUED] Progesterone 100 MG CAPS       [DISCONTINUED] Cholecalciferol (VITAMIN D3) 1000 UNIT/SPRAY LIQD 4000 units daily (Patient not taking: Reported on 3/28/2025)      [DISCONTINUED] clonazePAM (KlonoPIN) 0.5 mg tablet  (Patient not taking: Reported on 3/28/2025)      [DISCONTINUED] clotrimazole-betamethasone (LOTRISONE) 1-0.05 % cream Apply topically 2 (two) times a day Short term, sparingly to area: short term, lip (Patient not taking: Reported on 3/28/2025) 45 g 0    [DISCONTINUED] diazepam (VALIUM) 5 mg tablet Take 5 mg by mouth 3 (three) times a day (Patient not taking: Reported on 3/28/2025)      [DISCONTINUED] DULoxetine (CYMBALTA) 30 mg delayed release capsule Take 30 mg by mouth daily (Patient not taking: Reported on 7/25/2024)      [DISCONTINUED] Dupixent 300 MG/2ML SOPN  (Patient not taking: Reported on 3/28/2025)      [DISCONTINUED] escitalopram (LEXAPRO) 10 mg tablet Pt. Stated she is taking 7.5 mg (Patient not taking: Reported on 3/28/2025)      [DISCONTINUED] Eucrisa 2 % OINT  (Patient not taking: Reported on 3/28/2025)      [DISCONTINUED] hydrocortisone 2.5 % ointment  (Patient not taking: Reported on 3/28/2025)      [DISCONTINUED] hydrOXYzine HCL (ATARAX) 25 mg tablet Take 25 mg by mouth daily at bedtime (Patient not taking: Reported on 7/25/2024)      [DISCONTINUED] mometasone (ELOCON) 0.1 % ointment  (Patient not taking: Reported on 3/28/2025)      [DISCONTINUED] Multiple  "Vitamins-Minerals (multivitamin with minerals) tablet Take 1 tablet by mouth daily (Patient not taking: Reported on 3/28/2025)      [DISCONTINUED] Omega-3 Fatty Acids (fish oil) 1,000 mg Take 1,000 mg by mouth daily (Patient not taking: Reported on 3/28/2025)      [DISCONTINUED] phytonadione (MEPHYTON) 5 mg tablet Take 5 mg by mouth once (Patient not taking: Reported on 7/25/2024)      [DISCONTINUED] pimecrolimus (ELIDEL) 1 % cream  (Patient not taking: Reported on 3/28/2025)      [DISCONTINUED] pregabalin (LYRICA) 50 mg capsule  (Patient not taking: Reported on 3/28/2025)      [DISCONTINUED] traZODone (DESYREL) 50 mg tablet Take 50 mg by mouth daily at bedtime as needed (Patient not taking: Reported on 7/25/2024)      [DISCONTINUED] triamcinolone (KENALOG) 0.1 % cream  (Patient not taking: Reported on 3/28/2025)      [DISCONTINUED] triamcinolone (KENALOG) 0.1 % ointment  (Patient not taking: Reported on 3/28/2025)       No current facility-administered medications on file prior to visit.         Medical History Reviewed by provider this encounter:  Tobacco  Allergies  Meds  Problems  Med Hx  Surg Hx  Fam Hx     .    Objective   /72 (BP Location: Right arm, Patient Position: Sitting, Cuff Size: Large)   Pulse 66   Ht 5' 4.5\" (1.638 m)   Wt 69.3 kg (152 lb 12.8 oz)   SpO2 99%   BMI 25.82 kg/m²      Body mass index is 25.82 kg/m².  Wt Readings from Last 3 Encounters:   03/28/25 69.3 kg (152 lb 12.8 oz)   07/25/24 68 kg (150 lb)   04/17/24 68.9 kg (152 lb)        Physical Exam  Vitals reviewed.   Constitutional:       Appearance: Normal appearance.   Cardiovascular:      Rate and Rhythm: Normal rate and regular rhythm.      Pulses: Normal pulses.      Heart sounds: Normal heart sounds.   Pulmonary:      Effort: Pulmonary effort is normal.      Breath sounds: Normal breath sounds.   Skin:     General: Skin is warm and dry.      Capillary Refill: Capillary refill takes less than 2 seconds. " "  Neurological:      General: No focal deficit present.      Mental Status: She is alert and oriented to person, place, and time.      No tremors with outstretched arms.  Psychiatric:         Mood and Affect: Mood normal.         Behavior: Behavior normal.     Labs:   No results found for: \"HGBA1C\"  Lab Results   Component Value Date    CREATININE 0.74 01/20/2024    CREATININE 0.69 01/14/2023    CREATININE 0.69 04/09/2022    BUN 17 01/20/2024    K 4.0 01/20/2024     01/20/2024    CO2 24 01/20/2024     eGFR   Date Value Ref Range Status   01/20/2024 95 >59 mL/min/1.73 Final   01/14/2023 103 > OR = 60 mL/min/1.73m2 Final     Comment:     The eGFR is based on the CKD-EPI 2021 equation. To calculate   the new eGFR from a previous Creatinine or Cystatin C  result, go to https://www.kidney.org/professionals/  kdoqi/[email or web address]     01/22/2021 95 ml/min/1.73sq m Final     Lab Results   Component Value Date    HDL 69 01/14/2023    TRIG 39 01/14/2023     Lab Results   Component Value Date    ALT 10 01/20/2024    AST 15 01/20/2024    ALKPHOS 63 01/14/2023     No results found for: \"[id number]\"  Lab Results   Component Value Date    FREET4 1.43 08/02/2024    TSI <89 06/26/2023       There are no Patient Instructions on file for this visit.    Discussed with the patient and all questioned fully answered. She will call me if any problems arise.    Administrative Statements     "

## 2025-03-28 NOTE — ASSESSMENT & PLAN NOTE
Clinically euthyroid. Last thyroid function tests from August 2024 with mildly low TSH.   Will recommend recheck thyroid function tests.     Orders:    TSH, 3rd generation; Future    T4, free; Future    T3; Future

## 2025-03-28 NOTE — ASSESSMENT & PLAN NOTE
Denies neck compressive symptoms.   Recommend 1 yr f/u ultrasound from August 2024.     Orders:    US thyroid; Future

## 2025-08-08 ENCOUNTER — TELEPHONE (OUTPATIENT)
Dept: ENDOCRINOLOGY | Facility: CLINIC | Age: 55
End: 2025-08-08